# Patient Record
Sex: MALE | Race: WHITE | NOT HISPANIC OR LATINO | Employment: OTHER | URBAN - METROPOLITAN AREA
[De-identification: names, ages, dates, MRNs, and addresses within clinical notes are randomized per-mention and may not be internally consistent; named-entity substitution may affect disease eponyms.]

---

## 2021-05-18 ENCOUNTER — TELEPHONE (OUTPATIENT)
Dept: SCHEDULING | Facility: IMAGING CENTER | Age: 66
End: 2021-05-18

## 2021-05-20 ENCOUNTER — OFFICE VISIT (OUTPATIENT)
Dept: MEDICAL GROUP | Facility: PHYSICIAN GROUP | Age: 66
End: 2021-05-20
Payer: MEDICARE

## 2021-05-20 VITALS
HEIGHT: 68 IN | WEIGHT: 215.8 LBS | HEART RATE: 61 BPM | OXYGEN SATURATION: 97 % | BODY MASS INDEX: 32.71 KG/M2 | SYSTOLIC BLOOD PRESSURE: 122 MMHG | TEMPERATURE: 97.1 F | RESPIRATION RATE: 18 BRPM | DIASTOLIC BLOOD PRESSURE: 62 MMHG

## 2021-05-20 DIAGNOSIS — R94.4 DECREASED GFR: ICD-10-CM

## 2021-05-20 DIAGNOSIS — E78.5 HYPERLIPIDEMIA, UNSPECIFIED HYPERLIPIDEMIA TYPE: ICD-10-CM

## 2021-05-20 DIAGNOSIS — E55.9 VITAMIN D DEFICIENCY: ICD-10-CM

## 2021-05-20 DIAGNOSIS — R73.01 ELEVATED FASTING GLUCOSE: ICD-10-CM

## 2021-05-20 DIAGNOSIS — N40.0 BENIGN PROSTATIC HYPERPLASIA WITHOUT LOWER URINARY TRACT SYMPTOMS: ICD-10-CM

## 2021-05-20 DIAGNOSIS — D69.6 THROMBOCYTOPENIA, UNSPECIFIED (HCC): ICD-10-CM

## 2021-05-20 DIAGNOSIS — R73.03 PREDIABETES: ICD-10-CM

## 2021-05-20 DIAGNOSIS — I10 ESSENTIAL HYPERTENSION: ICD-10-CM

## 2021-05-20 DIAGNOSIS — Z00.00 HEALTH CARE MAINTENANCE: ICD-10-CM

## 2021-05-20 DIAGNOSIS — N52.8 OTHER MALE ERECTILE DYSFUNCTION: ICD-10-CM

## 2021-05-20 PROBLEM — M70.21 OLECRANON BURSITIS OF RIGHT ELBOW: Status: ACTIVE | Noted: 2021-05-20

## 2021-05-20 PROCEDURE — 99203 OFFICE O/P NEW LOW 30 MIN: CPT | Performed by: NURSE PRACTITIONER

## 2021-05-20 RX ORDER — ROSUVASTATIN CALCIUM 5 MG/1
TABLET, COATED ORAL DAILY
COMMUNITY
End: 2022-11-16 | Stop reason: SDUPTHER

## 2021-05-20 RX ORDER — TERAZOSIN 10 MG/1
10 CAPSULE ORAL
COMMUNITY
End: 2022-02-22 | Stop reason: SDUPTHER

## 2021-05-20 RX ORDER — CELECOXIB 200 MG/1
CAPSULE ORAL
COMMUNITY
Start: 2021-03-20 | End: 2021-05-20

## 2021-05-20 RX ORDER — NIFEDIPINE 30 MG/1
TABLET, EXTENDED RELEASE ORAL
COMMUNITY
Start: 2021-03-24 | End: 2021-05-20

## 2021-05-20 RX ORDER — TERAZOSIN 10 MG/1
CAPSULE ORAL
COMMUNITY
Start: 2021-03-24 | End: 2021-05-20

## 2021-05-20 RX ORDER — TADALAFIL 5 MG/1
2.5 TABLET ORAL
COMMUNITY

## 2021-05-20 RX ORDER — ASPIRIN 81 MG/1
81 TABLET, CHEWABLE ORAL DAILY
COMMUNITY
End: 2022-05-18 | Stop reason: CLARIF

## 2021-05-20 RX ORDER — BENAZEPRIL HYDROCHLORIDE 40 MG/1
TABLET ORAL DAILY
COMMUNITY
End: 2022-10-07 | Stop reason: SDUPTHER

## 2021-05-20 RX ORDER — ALPRAZOLAM 0.25 MG/1
TABLET ORAL
COMMUNITY
End: 2021-05-20

## 2021-05-20 RX ORDER — BENAZEPRIL HYDROCHLORIDE 40 MG/1
TABLET ORAL
COMMUNITY
Start: 2021-03-24 | End: 2021-05-20

## 2021-05-20 RX ORDER — HYDROCODONE BITARTRATE AND ACETAMINOPHEN 5; 325 MG/1; MG/1
TABLET ORAL
COMMUNITY
End: 2021-06-16

## 2021-05-20 RX ORDER — ROSUVASTATIN CALCIUM 5 MG/1
TABLET, COATED ORAL
COMMUNITY
Start: 2021-03-24 | End: 2021-05-20

## 2021-05-20 ASSESSMENT — ENCOUNTER SYMPTOMS
BACK PAIN: 0
NECK PAIN: 0
FEVER: 0
CLAUDICATION: 0
COUGH: 0
DIZZINESS: 1
FALLS: 0
WEIGHT LOSS: 0
HEADACHES: 0
DEPRESSION: 0
INSOMNIA: 0
BLOOD IN STOOL: 0
EYES NEGATIVE: 1
WEAKNESS: 0
WHEEZING: 0
ABDOMINAL PAIN: 0
PALPITATIONS: 0
SHORTNESS OF BREATH: 0
CHILLS: 0

## 2021-05-20 ASSESSMENT — PATIENT HEALTH QUESTIONNAIRE - PHQ9: CLINICAL INTERPRETATION OF PHQ2 SCORE: 0

## 2021-05-20 NOTE — ASSESSMENT & PLAN NOTE
Chronic and stable condition.  Currently taking amlodipine 10 mg daily, Benzapril 40 mg daily.  There is also medication for benazepril-hydrochlorothiazide however patient is unsure if he is still taking that or if it is the other 2 that he is taking, we will get a better medication list from his medical records or he will send us one.  Denies headaches, vision changes, lightheadedness, shortness of breath, swelling his lower extremities.

## 2021-05-20 NOTE — ASSESSMENT & PLAN NOTE
Patient had diagnosis of thrombocytopenia when he was in [place] and was seen by a hematologist.  At that time hematology told him that they would just continue to watch however they did not feel he had a true diagnosis of thrombocytopenia.  We will get new labs.  Addendum to previous note.  [facility] results received today 5/21/2021.  Blood was collected on 3/5/2021.  Platelet count of 126.  Patient was seen at [facility] by Dr. Edson Cardoso hematology for this.

## 2021-05-20 NOTE — ASSESSMENT & PLAN NOTE
Chronic and stable condition.  [facility] and now is more cautious with putting his arms and elbows on the tables  Currently controlled

## 2021-05-20 NOTE — PROGRESS NOTES
Chief Complaint   Patient presents with   • Establish Care      Subjective:     Antolin Omer is a 65 y.o. male who recently moved up here from [place] 8 months ago.    New labs received from [facility] on 5/21/2021.   Labs have been scanned in and are in the media tab.      He is presenting to establish care and management of :      Thrombocytopenia, unspecified (HCC)  Patient had diagnosis of thrombocytopenia when he was in [place] and was seen by a hematologist.  At that time hematology told him that they would just continue to watch however they did not feel he had a true diagnosis of thrombocytopenia.  We will get new labs.  Addendum to previous note.  [facility] diagnostic laboratory results received today 5/21/2021.  Blood was collected on 3/5/2021.  Platelet count of 126.  Patient was seen at [facility] by Dr. Edson Cardoso hematology for this.    Olecranon bursitis of right elbow  Chronic and stable condition.  Was recently seen at the [facility] for this and now is more cautious with putting his arms and elbows on the tables  Currently controlled    Essential hypertension  Chronic and stable condition.  Currently taking amlodipine 10 mg daily, Benzapril 40 mg daily.  There is also medication for benazepril-hydrochlorothiazide however patient is unsure if he is still taking that or if it is the other 2 that he is taking, we will get a better medication list from his medical records or he will send us one.  Denies headaches, vision changes, lightheadedness, shortness of breath, swelling his lower extremities.    Benign prostatic hyperplasia without lower urinary tract symptoms  Chronic and stable condition.  Currently controlled with Hytrin 10 mg daily.      Other male erectile dysfunction  Chronic and stable condition.  Uses Cialis 5 mg as needed.    Hyperlipidemia  Chronic and stable condition.  Rosuvastatin 5 mg daily.  He states that he had labs completed in February we will obtain  those labs from his previous provider.  Denies any myalgias with this.  Addendum to previous note  Blood work collected from [facility] on 3/5/2021 was received today.  Cholesterol-124  HDL-54  Triglycerides-97  LDL-52    Vitamin D deficiency  Chronic and stable condition.  Currently takes 200 units vitamin D daily.    Elevated fasting glucose  Addendum to previous note.  Labs were received from [facility] that were collected on 3/5/2021.  It was noted that a fasting blood glucose was 144.    Prediabetes  Addendum from previous note.  Labs received from [facility] for blood work that was completed on 3/5/2021.  Fasting blood glucose was 144  Hemoglobin A1c was 5.8    Decreased GFR  Addendum to previous note  Blood work received from [facility] on 5/21/2021.  Blood work was collected on 3/5/2021.  Estimated GFR 56.  We will continue to monitor as patient has no history of this.      Review of Systems   Constitutional: Negative for chills, fever and weight loss.   HENT: Negative.    Eyes: Negative.    Respiratory: Negative for cough, shortness of breath and wheezing.    Cardiovascular: Negative for chest pain, palpitations, claudication and leg swelling.   Gastrointestinal: Negative for abdominal pain and blood in stool.   Genitourinary: Negative for dysuria and hematuria.   Musculoskeletal: Negative for back pain, falls, joint pain and neck pain.   Skin: Negative.    Neurological: Positive for dizziness. Negative for weakness and headaches.        Low sodium issues and can feel dizziness with that   Psychiatric/Behavioral: Negative for depression and suicidal ideas. The patient does not have insomnia.             Current Outpatient Medications:   •  benazepril (LOTENSIN) 40 MG tablet, Take  by mouth every day., Disp: , Rfl:   •  vitamin D (VITAMIND D3) 1000 UNIT Tab, Take 2,000 Units by mouth every day., Disp: , Rfl:   •  HYDROcodone-acetaminophen (NORCO) 5-325 MG Tab per tablet, Take  by  "mouth., Disp: , Rfl:   •  rosuvastatin (CRESTOR) 5 MG Tab, Take  by mouth every day., Disp: , Rfl:   •  tadalafil (CIALIS) 5 MG tablet, Take 5 mg by mouth., Disp: , Rfl:   •  terazosin (HYTRIN) 10 MG capsule, Take 10 mg by mouth., Disp: , Rfl:   •  aspirin (ASA) 81 MG Chew Tab chewable tablet, Chew 81 mg every day., Disp: , Rfl:   •  amlodipine (NORVASC) 10 MG TABS, Take 1 Tab by mouth every day., Disp: 30 Tab, Rfl: 0  •  carvedilol (COREG) 25 MG TABS, Take 1 Tab by mouth 2 times a day, with meals., Disp: 60 Tab, Rfl: 0  •  benazepril-hydrochlorthiazide (LOTENSIN HCT) 20-12.5 MG per tablet, Take 1 Tab by mouth every day., Disp: 30 Tab, Rfl: 0    History reviewed. No pertinent past medical history.    History reviewed. No pertinent surgical history.    Family History   Problem Relation Age of Onset   • Stroke Mother    • Heart Disease Mother         bypass   • Lung Disease Sister         COPD   • Diabetes Sister        Patient has no known allergies.    Allergies, past medical history, past surgical history, family history, social history reviewed and updated    Objective:     Vitals: /62   Pulse 61   Temp 36.2 °C (97.1 °F) (Temporal)   Resp 18   Ht 1.727 m (5' 8\")   Wt 97.9 kg (215 lb 12.8 oz)   SpO2 97%   BMI 32.81 kg/m²   General: Alert, pleasant, NAD  EYES:   PERRL, EOMI, no icterus or pallor.  Conjunctivae and lids normal.   HENT:  Normocephalic.  External ears normal. Tympanic membranes pearly, opaque.  No nasal drainage present.  Oropharynx non-erythematous, mucous membranes moist.  Neck supple.   No thyromegaly or masses palpated. No cervical or supraclavicular lymphadenopathy.  Heart: Regular rate and rhythm.  S1 and S2 normal.  No murmurs appreciated.  Respiratory: Normal respiratory effort.  Clear to auscultation bilaterally.  Abdomen: Non-distended, soft, non-tender, no guarding/rebound. Bowel sounds present.  No hepatosplenomegaly.  No hernias.  Skin: Warm, dry, no rashes.  Musculoskeletal: " Gait is normal.  Moves all extremities well.    Extremities: No clubbing, cyanosis or edema noted.   Neurological: No tremors, sensation grossly intact, tone/strength normal, gait is normal, CN2-12 intact.  Psych:  Affect/mood is normal, judgement is good, memory is intact, grooming is appropriate.    Assessment/Plan:     1. Health care maintenance  Patient states he had a colonoscopy roughly 5 years ago.  We will get medical records.  He also states that he has been vaccinated for Tdap, zosters, pneumonia which we will also get records for.    2. Essential hypertension  - CBC WITH DIFFERENTIAL; Future  - Comp Metabolic Panel; Future  We will obtain medical records for a better clarification on his medication list.      3. Benign prostatic hyperplasia without lower urinary tract symptoms  Continue with medications as prescribed    4. Other male erectile dysfunction  Continue with medications as prescribed    5. Hyperlipidemia, unspecified hyperlipidemia type  - Lipid Profile; Future  We will obtain medical records for his labs that were completed in February.  Continue with lifestyle modifications, diet and exercise.    6. Vitamin D deficiency  - VITAMIN D,25 HYDROXY; Future    7. Thrombocytopenia, unspecified (HCC)  - CBC WITH DIFFERENTIAL; Future  We will trend his CBC from his February exam.    Discussed with patient possible alternative diagnoses.     Reviewed indication, dosage, usage and potential adverse effects of prescribed medications.     Reviewed risks and benefits of treatment plan. Patient verbalizes understanding of all instruction and verbally agrees to plan.    Discussed plan with the patient, and she agrees to the above.      I personally reviewed prior external notes and test results pertinent to today's visit.        Return in about 4 weeks (around 6/17/2021).    My total time spent caring for the patient on the day of the encounter was 30 minutes.   This does not include time spent on separately  billable procedures/tests.     Please note that this dictation was created using voice recognition software. I have made every reasonable attempt to correct obvious errors, but I expect that there may be errors of grammar and possibly content that I did not discover before finalizing the note.

## 2021-05-20 NOTE — ASSESSMENT & PLAN NOTE
Chronic and stable condition.  Rosuvastatin 5 mg daily.  He states that he had labs completed in February we will obtain those labs from his previous provider.  Denies any myalgias with this.  Addendum to previous note  Blood work collected from [facility] on 3/5/2021 was received today.  Cholesterol-124  HDL-54  Triglycerides-97  LDL-52

## 2021-05-21 PROBLEM — R73.01 ELEVATED FASTING GLUCOSE: Status: ACTIVE | Noted: 2021-05-21

## 2021-05-21 PROBLEM — R73.03 PREDIABETES: Status: ACTIVE | Noted: 2021-05-21

## 2021-05-21 PROBLEM — R94.4 DECREASED GFR: Status: ACTIVE | Noted: 2021-05-21

## 2021-05-21 NOTE — ASSESSMENT & PLAN NOTE
Addendum to previous note  Blood work received from [facility] on 5/21/2021.  Blood work was collected on 3/5/2021.  Estimated GFR 56.  We will continue to monitor as patient has no history of this.

## 2021-06-16 ENCOUNTER — OFFICE VISIT (OUTPATIENT)
Dept: MEDICAL GROUP | Facility: PHYSICIAN GROUP | Age: 66
End: 2021-06-16
Payer: COMMERCIAL

## 2021-06-16 VITALS
DIASTOLIC BLOOD PRESSURE: 72 MMHG | OXYGEN SATURATION: 97 % | WEIGHT: 221 LBS | TEMPERATURE: 97.3 F | SYSTOLIC BLOOD PRESSURE: 130 MMHG | BODY MASS INDEX: 33.49 KG/M2 | HEART RATE: 71 BPM | RESPIRATION RATE: 12 BRPM | HEIGHT: 68 IN

## 2021-06-16 DIAGNOSIS — E87.1 HYPONATREMIA: ICD-10-CM

## 2021-06-16 DIAGNOSIS — E78.5 HYPERLIPIDEMIA, UNSPECIFIED HYPERLIPIDEMIA TYPE: ICD-10-CM

## 2021-06-16 DIAGNOSIS — I10 ESSENTIAL HYPERTENSION: ICD-10-CM

## 2021-06-16 PROCEDURE — 99213 OFFICE O/P EST LOW 20 MIN: CPT | Performed by: NURSE PRACTITIONER

## 2021-06-16 RX ORDER — NIFEDIPINE 30 MG/1
TABLET, EXTENDED RELEASE ORAL
COMMUNITY
Start: 2021-06-02 | End: 2021-06-16

## 2021-06-16 ASSESSMENT — ENCOUNTER SYMPTOMS
TINGLING: 0
WEAKNESS: 0
COUGH: 0
SHORTNESS OF BREATH: 0
DIZZINESS: 0
PALPITATIONS: 0
WEIGHT LOSS: 0
ABDOMINAL PAIN: 0
CHILLS: 0
HEADACHES: 1
FEVER: 0

## 2021-06-16 NOTE — ASSESSMENT & PLAN NOTE
Chronic and stable condition.  States that he has been told previously that he occasionally gets low on his sodiums  Last sodium was 132.  Denies any nausea, vomiting, headaches, confusion

## 2021-06-16 NOTE — PROGRESS NOTES
CC:  Chief Complaint   Patient presents with   • Lab Results       HISTORY OF PRESENT ILLNESS: Patient is a 65 y.o. male established patient presenting follow up labs      Essential hypertension  Chronic and stable condition.  He is currently taking amlodipine 10 mg daily, carvedilol 25 mg twice daily he also states that he is taking benazepril but is unsure if it is benazepril with hydrochlorothiazide or-regular benazepril.  He will be going home to look at the medications today.  Blood pressure in the office today was 130/72  Denies any change in vision, chest pain, difficulty breathing or swelling his lower extremities  States that occasionally he will get a headache however he is not believe that this is related to his blood pressure    Hyperlipidemia  Chronic and stable condition.   Controlled rosuvastatin 5 mg nightly.  Denies any myalgias with this medication    Hyponatremia  Chronic and stable condition.  States that he has been told previously that he occasionally gets low on his sodiums  Last sodium was 132.  Denies any nausea, vomiting, headaches, confusion      Patient Active Problem List    Diagnosis Date Noted   • Hyponatremia 06/16/2021   • Elevated fasting glucose 05/21/2021   • Prediabetes 05/21/2021   • Decreased GFR 05/21/2021   • Olecranon bursitis of right elbow 05/20/2021   • Essential hypertension 05/20/2021   • Benign prostatic hyperplasia without lower urinary tract symptoms 05/20/2021   • Other male erectile dysfunction 05/20/2021   • Hyperlipidemia 05/20/2021   • Vitamin D deficiency 05/20/2021   • Thrombocytopenia, unspecified (HCC) 04/12/2021      Allergies:Patient has no known allergies.    Current Outpatient Medications   Medication Sig Dispense Refill   • benazepril (LOTENSIN) 40 MG tablet Take  by mouth every day.     • vitamin D (VITAMIND D3) 1000 UNIT Tab Take 2,000 Units by mouth every day.     • rosuvastatin (CRESTOR) 5 MG Tab Take  by mouth every day.     • tadalafil (CIALIS) 5  "MG tablet Take 5 mg by mouth.     • terazosin (HYTRIN) 10 MG capsule Take 10 mg by mouth.     • aspirin (ASA) 81 MG Chew Tab chewable tablet Chew 81 mg every day.     • amlodipine (NORVASC) 10 MG TABS Take 1 Tab by mouth every day. 30 Tab 0   • carvedilol (COREG) 25 MG TABS Take 1 Tab by mouth 2 times a day, with meals. 60 Tab 0     No current facility-administered medications for this visit.       Social History     Tobacco Use   • Smoking status: Former Smoker     Types: Cigarettes     Quit date:      Years since quittin.4   • Smokeless tobacco: Never Used   Vaping Use   • Vaping Use: Never used   Substance Use Topics   • Alcohol use: Yes     Alcohol/week: 2.4 - 3.0 oz     Types: 4 - 5 Cans of beer per week     Comment: occ   • Drug use: Never     Social History     Social History Narrative   • Not on file       Family History   Problem Relation Age of Onset   • Stroke Mother    • Heart Disease Mother         bypass   • Lung Disease Sister         COPD   • Diabetes Sister         Review of Systems   Constitutional: Negative for chills, fever and weight loss.   Respiratory: Negative for cough and shortness of breath.    Cardiovascular: Negative for chest pain, palpitations and leg swelling.   Gastrointestinal: Negative for abdominal pain.   Genitourinary: Negative for hematuria.   Neurological: Positive for headaches. Negative for dizziness, tingling and weakness.             - NOTE: All other systems reviewed and are negative, except as in HPI.      Exam:    /72   Pulse 71   Temp 36.3 °C (97.3 °F) (Temporal)   Resp 12   Ht 1.727 m (5' 8\")   Wt 100 kg (221 lb)   SpO2 97%  Body mass index is 33.6 kg/m².    General: Alert, pleasant, NAD  EYES:   PERRL, EOMI, no icterus or pallor.  Conjunctivae and lids normal.   HENT:  Normocephalic.  External ears normal.  Heart: Regular rate and rhythm.  S1 and S2 normal.  No murmurs appreciated.  Respiratory: Normal respiratory effort.  Clear to auscultation " bilaterally.  Skin: Warm, dry, no rashes.  Musculoskeletal: Gait is normal.  Moves all extremities well.    Extremities: No clubbing, cyanosis or edema noted.   Neurological: No tremors, sensation grossly intact, tone/strength normal, gait is normal, CN2-12 intact.  Psych:  Affect/mood is normal, judgement is good, memory is intact, grooming is appropriate.      LABS: 6/12/2021- [facility] in media tab: Results reviewed and discussed with the patient, questions answered.    Assessment/Plan:     1. Essential hypertension  - REFERRAL TO CARDIOLOGY  Patient is requesting to drop down some of his medications. Educated and discussed with patient that his blood pressure is low and within normal limits due to his blood pressure medications  Patient is requesting to follow-up with cardiology as he had one previous in [place]    2. Hyperlipidemia, unspecified hyperlipidemia type  - REFERRAL TO CARDIOLOGY  Continue lifestyle modifications diet and exercise.     3. Hyponatremia  We will continue to monitor    Discussed with patient possible alternative diagnoses, patient is to take all medications as prescribed.     If symptoms persist FU w/PCP, if symptoms worsen go to emergency room.     If experiencing any side effects from prescribed medications reports to the office immediately or go to emergency room.    Reviewed indication, dosage, usage and potential adverse effects of prescribed medications.     Reviewed risks and benefits of treatment plan. Patient verbalizes understanding of all instruction and verbally agrees to plan.      Return in about 6 months (around 12/16/2021) for AWV .    My total time spent caring for the patient on the day of the encounter was 20 minutes.   This does not include time spent on separately billable procedures/tests.     Please note that this dictation was created using voice recognition software. I have made every reasonable attempt to correct obvious errors, but I expect that there are errors  of grammar and possibly content that I did not discover before finalizing the note.

## 2021-06-16 NOTE — ASSESSMENT & PLAN NOTE
Chronic and stable condition.   Controlled rosuvastatin 5 mg nightly.  Denies any myalgias with this medication

## 2021-06-16 NOTE — ASSESSMENT & PLAN NOTE
Chronic and stable condition.  He is currently taking amlodipine 10 mg daily, carvedilol 25 mg twice daily he also states that he is taking benazepril but is unsure if it is benazepril with hydrochlorothiazide or-regular benazepril.  He will be going home to look at the medications today.  Blood pressure in the office today was 130/72  Denies any change in vision, chest pain, difficulty breathing or swelling his lower extremities  States that occasionally he will get a headache however he is not believe that this is related to his blood pressure

## 2021-09-16 NOTE — PROGRESS NOTES
Subjective:   Chief Complaint:   Chief Complaint   Patient presents with   • Hyperlipidemia     Dx: Hyperlipidemia   • Hypertension     Dx: Essential hypertension       Antolin Omer is a 66 y.o. male who is referred by BREE Solano to establish with a local cardiologist for coronary artery calcification, FH CAD/Valve disease, PACs.    Saw cardiologist in [place] in 2016 because of FH.  Had very minor CP.  Had coronary CT with plaque, started on statin.  Had LHC, no obstructive disease, 2016.  Also had stress test.  Was told to get an echo.  Was been on primary prevention ASA and statin.    Has HTN, controlled. 3 meds.  Not checking at home.    On statin, no actual HLP.    Has IFG, no DM.    Quit smoking around age 26.    No history of diabetes.  No history of autoimmune disease such as lupus or rheumatoid arthritis.  No chronic kidney disease.  No ETOH overuse. Daily beer, not overuse he tells me.  No caffeine overuse. A few per day.  No recreation substance use.  No hx asthma.    Has BPH, on one med for this.    He had adrenal gland tumor removed.    Has hyponatremia, does get some lightheadedness when it is low.    FH:  -Mother  at 74 of CVA, valve replacement around 70 with CABG, copd/smoker  -Father  at age 28 (he was 6 years old), car accident, was a   -Brother, alive at 71, had UTI, sepsis, CP, LHC, had 95% blockages and needs a new valve.    Not limited by chest pain, pressure or tightness with activity.   No significant dyspnea on exertion, orthopnea or lower extremity swelling.   No significant palpitations, lightheadedness, or presyncope/syncope.   No symptoms of leg claudication.   No stroke/TIA like symptoms.    Moved from [place], in [place], had lived here before.   to [place], not here today.  Contractor and .     DATA REVIEWED by me:  ECG (my personal interpretation) 21  Sinus, 55, PAC, first degree AV delay,     Echo pending    Most recent labs:       No  results found for: WBC, HEMOGLOBIN, HEMATOCRIT, MCV, INR, TSH   No results found for: SODIUM, POTASSIUM, CHLORIDE, CO2, GLUCOSE, BUN, CREATININE, GLOMRATE   No results found for: ASTSGOT, ALTSGPT, ALBUMIN   No results found for: CHOLSTRLTOT, LDL, HDL, TRIGLYCERIDE  No results for input(s): NTPROBNP, TROPONINT in the last 72 hours.      History reviewed. No pertinent past medical history.  History reviewed. No pertinent surgical history.  Family History   Problem Relation Age of Onset   • Stroke Mother          of CVA at 74   • Heart Disease Mother         CABG/Valve at 70   • Other Father          at 28 MVA   • Lung Disease Sister         COPD   • Diabetes Sister    • Heart Disease Brother         3V CAD, going for valve and CABG     Social History     Socioeconomic History   • Marital status:      Spouse name: Not on file   • Number of children: Not on file   • Years of education: Not on file   • Highest education level: Not on file   Occupational History   • Occupation: retail   Tobacco Use   • Smoking status: Former Smoker     Types: Cigarettes     Quit date:      Years since quittin.7   • Smokeless tobacco: Never Used   Vaping Use   • Vaping Use: Never used   Substance and Sexual Activity   • Alcohol use: Yes     Alcohol/week: 2.4 - 3.0 oz     Types: 4 - 5 Cans of beer per week     Comment: occ   • Drug use: Never   • Sexual activity: Yes   Other Topics Concern   • Not on file   Social History Narrative   • Not on file     Social Determinants of Health     Financial Resource Strain:    • Difficulty of Paying Living Expenses:    Food Insecurity:    • Worried About Running Out of Food in the Last Year:    • Ran Out of Food in the Last Year:    Transportation Needs:    • Lack of Transportation (Medical):    • Lack of Transportation (Non-Medical):    Physical Activity:    • Days of Exercise per Week:    • Minutes of Exercise per Session:    Stress:    • Feeling of Stress :    Social  "Connections:    • Frequency of Communication with Friends and Family:    • Frequency of Social Gatherings with Friends and Family:    • Attends Restorationism Services:    • Active Member of Clubs or Organizations:    • Attends Club or Organization Meetings:    • Marital Status:    Intimate Partner Violence:    • Fear of Current or Ex-Partner:    • Emotionally Abused:    • Physically Abused:    • Sexually Abused:      No Known Allergies    Current Outpatient Medications   Medication Sig Dispense Refill   • NIFEdipine SR (PROCARDIA-XL) 30 MG tablet Take 30 mg by mouth every day.     • benazepril (LOTENSIN) 40 MG tablet Take  by mouth every day.     • vitamin D (VITAMIND D3) 1000 UNIT Tab Take 2,000 Units by mouth every day.     • rosuvastatin (CRESTOR) 5 MG Tab Take  by mouth every day.     • tadalafil (CIALIS) 5 MG tablet Take 5 mg by mouth.     • terazosin (HYTRIN) 10 MG capsule Take 10 mg by mouth.     • aspirin (ASA) 81 MG Chew Tab chewable tablet Chew 81 mg every day.     • carvedilol (COREG) 25 MG TABS Take 1 Tab by mouth 2 times a day, with meals. 60 Tab 0     No current facility-administered medications for this visit.       ROS  All others systems reviewed and negative.     Objective:     /56 (BP Location: Left arm, Patient Position: Sitting, BP Cuff Size: Adult)   Pulse 60   Ht 1.727 m (5' 8\")   Wt 102 kg (224 lb 6.4 oz)   SpO2 96%  Body mass index is 34.12 kg/m².    General: No acute distress. Well nourished.  HEENT: EOM grossly intact, no scleral icterus, no pharyngeal erythema.   Neck:  No JVD, no bruits, trachea midline  CVS: RRR. Normal S1, S2. No M/R/G. No LE edema.  2+ radial pulses, 2+ PT pulses  Resp: CTAB. No wheezing or crackles/rhonchi. Normal respiratory effort.  Crepitus left anterior chest  Abdomen: Soft, NT, no maribel hepatomegaly.  MSK/Ext: No clubbing or cyanosis.  Skin: Warm and dry, no rashes.  Neurological: CN III-XII grossly intact. No focal deficits.   Psych: A&O x 3, appropriate " affect, good judgement        Assessment:     1. Essential hypertension  EKG    EC-ECHOCARDIOGRAM COMPLETE W/O CONT   2. Vitamin D deficiency     3. Elevated fasting glucose     4. Pure hypercholesterolemia  EKG   5. Coronary artery calcification seen on CT scan     6. Premature atrial contraction     7. Hyponatremia     8. IFG (impaired fasting glucose)     9. Family history of aortic stenosis  EC-ECHOCARDIOGRAM COMPLETE W/O CONT   10. Family history of coronary artery disease in brother     11. Family history of coronary artery disease in mother         Medical Decision Making:  Today's Assessment / Status / Plan:     -Blood pressure controlled  -On primary prevention aspirin and statin, given family history, I am fine with aspirin  -Not sure of his most recent cholesterol, will order today  -Needs echocardiogram, particularly with family history of valve disease, ordered today  -I do not know what to make of his left lung exam, it almost reminds me of crepitus but he does have normal breath sounds, this is not alarming to me but he may want to mention it to his primary care  -Hyponatremia is interesting, not on any medications that would intermittently cause this, unclear if related to adrenal tumor which was removed  -RTC 6 month        Written instructions given today:      Coronary artery calcification:    Most people over 70 have some degree of calcified heart disease.    Blockages in the heart artery should only have a stent or bypass if they are more than 70% blocked (at which point most people have symptoms such as chest pain or unusual shortness of breath with activity that goes away with rest).   People having a sudden heart attack should have a stent placed in the blocked artery.   People having chest pain that limits their ability to function could consider having a stent placed in the artery.  Otherwise, medication and lifestyle changes are the preferred treatments. Stents placed outside of severe  "symptoms or sudden heart attacks do not reduce mortality (likelihood of dying from a heart attack) and often people end up needing a stent within a stent later.  Lifestyle and medications are more important than stents or bypass under most circumstances.    The only things to do to lower your risk of sudden heart attack (or stroke which is the same disease but a different location- in the brain):    -Take a statin medication (as a vascular medication, not just a cholesterol medication) which is our most powerful weapon to stabilize the plaques and reduce inflammation making them less likely to rupture open and cause a sudden heart attack/stroke.   -Keep LDL cholesterol under 100 (or under 70 if you have already had a heart attack/stroke).  -Control blood pressure, under 130/80, ideally closer to 120/80.  -Control blood sugar, don't get diabetes.  -Don't smoke.  -Eat a heart healthy diet that reduces inflammation: Pritikin, Mediterranean, Vegetarian, Vegan  -Get regular exercise: 150 minutes of moderate exercise OR 75 minutes of very vigorous exercise every week.  -Keep your body mass index under 30, ideal BMI is 20-25.  -Know the signs and symptoms of heart attack and stroke and when to call 9-1-1.  -Shoveling snow can provoke a heart attack because the cold weather constricts heart arteries and heavy/prolonged straining can reduce blood flow down heart arteries.    Signs of a stroke: sudden inability to talk, smile on one side, confusion, inability to move arm/leg on one side, numbness/tingling of arm/leg on one side that is not typical. \"Think FAST.\"  F=face drooping  A=arm weakness  S=Speech difficulty  T= time to call 911 (do not give ride to someone, call ambulance to alert the hospital to start stroke protocol)    Signs of a heart attack: Anything very intense coming from the chest that lasts more than 15 minutes, consider calling 911.  -Pain or pressure in the chest that is intense, lasts more than 15 " minutes, not explained by other known reasons such as known/similar heartburn  -It can feel like severe heart burn but associated with nausea, vomiting  -It can be vague pain that radiates to the neck, jaw, shoulders, arm/arms, wrap around your back or even cause a toothache  -Can be associated with unusual shortness of breath at rest or sense of impending doom      Please look into the following diets:    Mediterranean diet.  Pritikin - used at [facility], probably one of the best for anti-inflammatory  DASH DIET - American Heart Association (mostly for hypertension)  Ornish Diet   Vegan diet (proven to reverse vascular disease)    Resources to learn more:  American Heart Association   Www.Cardiosmart.org, sponsored by the American College of cardiology.    -Echocardiogram, I will send a [employer] message when I get the results    -Fasting lipid panel, I will order today [facility] with your primary care blood work    -Call the [employer] line to get set up, either on your computer or by downloading an alonzo on your smart phone.  Keep them on the phone with you until you are sure you can access the alonzo or website.  If you use your phone, you can set up a finger print to use if you tend to forget passwords.  Call [phone] or [phone].    -You should always hear results of testing within 5 days with my interpretation, if you do not, send a [employer] message or call the office: [phone].      Return in about 6 months (around 3/22/2022).    It is my pleasure to participate in the care of Mr. Omer.  Please do not hesitate to contact me with questions or concerns.    Soraya Turcios MD, [facility]  Cardiologist [facility]    Please note that this dictation was created using voice recognition software. I have made every reasonable attempt to correct obvious errors, but it is possible there are errors of grammar and possibly content that I did not discover before  finalizing the note.

## 2021-09-22 ENCOUNTER — OFFICE VISIT (OUTPATIENT)
Dept: CARDIOLOGY | Facility: PHYSICIAN GROUP | Age: 66
End: 2021-09-22
Payer: MEDICARE

## 2021-09-22 VITALS
HEIGHT: 68 IN | OXYGEN SATURATION: 96 % | HEART RATE: 60 BPM | SYSTOLIC BLOOD PRESSURE: 122 MMHG | DIASTOLIC BLOOD PRESSURE: 56 MMHG | BODY MASS INDEX: 34.01 KG/M2 | WEIGHT: 224.4 LBS

## 2021-09-22 DIAGNOSIS — Z82.49 FAMILY HISTORY OF CORONARY ARTERY DISEASE IN BROTHER: ICD-10-CM

## 2021-09-22 DIAGNOSIS — I49.1 PREMATURE ATRIAL CONTRACTION: ICD-10-CM

## 2021-09-22 DIAGNOSIS — R73.01 IFG (IMPAIRED FASTING GLUCOSE): ICD-10-CM

## 2021-09-22 DIAGNOSIS — E55.9 VITAMIN D DEFICIENCY: ICD-10-CM

## 2021-09-22 DIAGNOSIS — E78.00 PURE HYPERCHOLESTEROLEMIA: ICD-10-CM

## 2021-09-22 DIAGNOSIS — Z82.49 FAMILY HISTORY OF CORONARY ARTERY DISEASE IN MOTHER: ICD-10-CM

## 2021-09-22 DIAGNOSIS — Z82.49 FAMILY HISTORY OF AORTIC STENOSIS: ICD-10-CM

## 2021-09-22 DIAGNOSIS — I25.10 CORONARY ARTERY CALCIFICATION SEEN ON CT SCAN: ICD-10-CM

## 2021-09-22 DIAGNOSIS — E87.1 HYPONATREMIA: ICD-10-CM

## 2021-09-22 DIAGNOSIS — I10 ESSENTIAL HYPERTENSION: ICD-10-CM

## 2021-09-22 DIAGNOSIS — R73.01 ELEVATED FASTING GLUCOSE: ICD-10-CM

## 2021-09-22 PROCEDURE — 99204 OFFICE O/P NEW MOD 45 MIN: CPT | Performed by: INTERNAL MEDICINE

## 2021-09-22 RX ORDER — NIFEDIPINE 30 MG/1
30 TABLET, EXTENDED RELEASE ORAL DAILY
COMMUNITY
End: 2022-03-31 | Stop reason: SDUPTHER

## 2021-09-22 NOTE — LETTER
[facility]   [address] [place] [address]  Phone: [phone]  Fax: [phone]              Antolin Omer  1955    Encounter Date: 2021    Soraya Turcios M.D.          PROGRESS NOTE:  Subjective:   Chief Complaint:   Chief Complaint   Patient presents with   • Hyperlipidemia     Dx: Hyperlipidemia   • Hypertension     Dx: Essential hypertension       Antolin Omer is a 66 y.o. male who is referred by BREE Solano to establish with a local cardiologist for coronary artery calcification, FH CAD/Valve disease, PACs.    Saw cardiologist in [place] in 2016 because of FH.  Had very minor CP.  Had coronary CT with plaque, started on statin.  Had LHC, no obstructive disease, 2016.  Also had stress test.  Was told to get an echo.  Was been on primary prevention ASA and statin.    Has HTN, controlled. 3 meds.  Not checking at home.    On statin, no actual HLP.    Has IFG, no DM.    Quit smoking around age 26.    No history of diabetes.  No history of autoimmune disease such as lupus or rheumatoid arthritis.  No chronic kidney disease.  No ETOH overuse. Daily beer, not overuse he tells me.  No caffeine overuse. A few per day.  No recreation substance use.  No hx asthma.    Has BPH, on one med for this.    He had adrenal gland tumor removed.    Has hyponatremia, does get some lightheadedness when it is low.    FH:  -Mother  at 74 of CVA, valve replacement around 70 with CABG, copd/smoker  -Father  at age 28 (he was 6 years old), car accident, was a   -Brother, alive at 71, had UTI, sepsis, CP, LHC, had 95% blockages and needs a new valve.    Not limited by chest pain, pressure or tightness with activity.   No significant dyspnea on exertion, orthopnea or lower extremity swelling.   No significant palpitations, lightheadedness, or presyncope/syncope.   No symptoms of leg claudication.   No stroke/TIA like symptoms.    Moved  from [place], in [place], had lived here before.   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      History reviewed. No pertinent past medical history.  History reviewed. No pertinent surgical history.  Family History   Problem Relation Age of Onset   • Stroke Mother          of CVA at 74   • Heart Disease Mother         CABG/Valve at 70   • Other Father          at 28 MVA   • Lung Disease Sister         COPD   • Diabetes Sister    • Heart Disease Brother         3V CAD, going for valve and CABG     Social History     Socioeconomic History   • Marital status:      Spouse name: Not on file   • Number of children: Not on file   • Years of education: Not on file   • Highest education level: Not on file   Occupational History   • Occupation: retail   Tobacco Use   • Smoking status: Former Smoker     Types: Cigarettes     Quit date:      Years since quittin.7   • Smokeless tobacco: Never Used   Vaping Use   • Vaping Use: Never used   Substance and Sexual Activity   • Alcohol use: Yes     Alcohol/week: 2.4 - 3.0 oz     Types: 4 - 5 Cans of beer per week     Comment: occ   • Drug use: Never   • Sexual activity: Yes   Other Topics Concern   • Not on file   Social History Narrative   • Not on file     Social Determinants of Health     Financial Resource Strain:    • Difficulty of Paying Living Expenses:    Food Insecurity:    • Worried About Running Out of Food in the Last Year:    • Ran Out of Food  "in the Last Year:    Transportation Needs:    • Lack of Transportation (Medical):    • Lack of Transportation (Non-Medical):    Physical Activity:    • Days of Exercise per Week:    • Minutes of Exercise per Session:    Stress:    • Feeling of Stress :    Social Connections:    • Frequency of Communication with Friends and Family:    • Frequency of Social Gatherings with Friends and Family:    • Attends Anabaptist Services:    • Active Member of Clubs or Organizations:    • Attends Club or Organization Meetings:    • Marital Status:    Intimate Partner Violence:    • Fear of Current or Ex-Partner:    • Emotionally Abused:    • Physically Abused:    • Sexually Abused:      No Known Allergies    Current Outpatient Medications   Medication Sig Dispense Refill   • NIFEdipine SR (PROCARDIA-XL) 30 MG tablet Take 30 mg by mouth every day.     • benazepril (LOTENSIN) 40 MG tablet Take  by mouth every day.     • vitamin D (VITAMIND D3) 1000 UNIT Tab Take 2,000 Units by mouth every day.     • rosuvastatin (CRESTOR) 5 MG Tab Take  by mouth every day.     • tadalafil (CIALIS) 5 MG tablet Take 5 mg by mouth.     • terazosin (HYTRIN) 10 MG capsule Take 10 mg by mouth.     • aspirin (ASA) 81 MG Chew Tab chewable tablet Chew 81 mg every day.     • carvedilol (COREG) 25 MG TABS Take 1 Tab by mouth 2 times a day, with meals. 60 Tab 0     No current facility-administered medications for this visit.       ROS  All others systems reviewed and negative.     Objective:     /56 (BP Location: Left arm, Patient Position: Sitting, BP Cuff Size: Adult)   Pulse 60   Ht 1.727 m (5' 8\")   Wt 102 kg (224 lb 6.4 oz)   SpO2 96%  Body mass index is 34.12 kg/m².    General: No acute distress. Well nourished.  HEENT: EOM grossly intact, no scleral icterus, no pharyngeal erythema.   Neck:  No JVD, no bruits, trachea midline  CVS: RRR. Normal S1, S2. No M/R/G. No LE edema.  2+ radial pulses, 2+ PT pulses  Resp: CTAB. No wheezing or " crackles/rhonchi. Normal respiratory effort.  Crepitus left anterior chest  Abdomen: Soft, NT, no maribel hepatomegaly.  MSK/Ext: No clubbing or cyanosis.  Skin: Warm and dry, no rashes.  Neurological: CN III-XII grossly intact. No focal deficits.   Psych: A&O x 3, appropriate affect, good judgement        Assessment:     1. Essential hypertension  EKG    EC-ECHOCARDIOGRAM COMPLETE W/O CONT   2. Vitamin D deficiency     3. Elevated fasting glucose     4. Pure hypercholesterolemia  EKG   5. Coronary artery calcification seen on CT scan     6. Premature atrial contraction     7. Hyponatremia     8. IFG (impaired fasting glucose)     9. Family history of aortic stenosis  EC-ECHOCARDIOGRAM COMPLETE W/O CONT   10. Family history of coronary artery disease in brother     11. Family history of coronary artery disease in mother         Medical Decision Making:  Today's Assessment / Status / Plan:     -Blood pressure controlled  -On primary prevention aspirin and statin, given family history, I am fine with aspirin  -Not sure of his most recent cholesterol, will order today  -Needs echocardiogram, particularly with family history of valve disease, ordered today  -I do not know what to make of his left lung exam, it almost reminds me of crepitus but he does have normal breath sounds, this is not alarming to me but he may want to mention it to his primary care  -Hyponatremia is interesting, not on any medications that would intermittently cause this, unclear if related to adrenal tumor which was removed  -RTC 6 month        Written instructions given today:      Coronary artery calcification:    Most people over 70 have some degree of calcified heart disease.    Blockages in the heart artery should only have a stent or bypass if they are more than 70% blocked (at which point most people have symptoms such as chest pain or unusual shortness of breath with activity that goes away with rest).   People having a sudden heart attack  "should have a stent placed in the blocked artery.   People having chest pain that limits their ability to function could consider having a stent placed in the artery.  Otherwise, medication and lifestyle changes are the preferred treatments. Stents placed outside of severe symptoms or sudden heart attacks do not reduce mortality (likelihood of dying from a heart attack) and often people end up needing a stent within a stent later.  Lifestyle and medications are more important than stents or bypass under most circumstances.    The only things to do to lower your risk of sudden heart attack (or stroke which is the same disease but a different location- in the brain):    -Take a statin medication (as a vascular medication, not just a cholesterol medication) which is our most powerful weapon to stabilize the plaques and reduce inflammation making them less likely to rupture open and cause a sudden heart attack/stroke.   -Keep LDL cholesterol under 100 (or under 70 if you have already had a heart attack/stroke).  -Control blood pressure, under 130/80, ideally closer to 120/80.  -Control blood sugar, don't get diabetes.  -Don't smoke.  -Eat a heart healthy diet that reduces inflammation: Pritikin, Mediterranean, Vegetarian, Vegan  -Get regular exercise: 150 minutes of moderate exercise OR 75 minutes of very vigorous exercise every week.  -Keep your body mass index under 30, ideal BMI is 20-25.  -Know the signs and symptoms of heart attack and stroke and when to call 9-1-1.  -Shoveling snow can provoke a heart attack because the cold weather constricts heart arteries and heavy/prolonged straining can reduce blood flow down heart arteries.    Signs of a stroke: sudden inability to talk, smile on one side, confusion, inability to move arm/leg on one side, numbness/tingling of arm/leg on one side that is not typical. \"Think FAST.\"  F=face drooping  A=arm weakness  S=Speech difficulty  T= time to call 911 (do not give ride to " someone, call ambulance to alert the hospital to start stroke protocol)    Signs of a heart attack: Anything very intense coming from the chest that lasts more than 15 minutes, consider calling 911.  -Pain or pressure in the chest that is intense, lasts more than 15 minutes, not explained by other known reasons such as known/similar heartburn  -It can feel like severe heart burn but associated with nausea, vomiting  -It can be vague pain that radiates to the neck, jaw, shoulders, arm/arms, wrap around your back or even cause a toothache  -Can be associated with unusual shortness of breath at rest or sense of impending doom      Please look into the following diets:    Mediterranean diet.  Pritikin - used at [facility], probably one of the best for anti-inflammatory  DASH DIET - American Heart Association (mostly for hypertension)  Ornish Diet   Vegan diet (proven to reverse vascular disease)    Resources to learn more:  American Heart Association   Www.Cardiosmart.org, sponsored by the American College of cardiology.    -Echocardiogram, I will send a [employer] message when I get the results    -Fasting lipid panel, I will order today [facility] with your primary care blood work    -Call the [employer] line to get set up, either on your computer or by downloading an alonzo on your smart phone.  Keep them on the phone with you until you are sure you can access the alonzo or website.  If you use your phone, you can set up a finger print to use if you tend to forget passwords.  Call [phone] or [phone].    -You should always hear results of testing within 5 days with my interpretation, if you do not, send a [employer] message or call the office: [phone].      Return in about 6 months (around 3/22/2022).    It is my pleasure to participate in the care of Mr. Omer.  Please do not hesitate to contact me with questions or concerns.    Soraya Turcios MD, [facility]  Cardiologist [facility]  [facility] and V[facility]    Please note that this dictation was created using voice recognition software. I have made every reasonable attempt to correct obvious errors, but it is possible there are errors of grammar and possibly content that I did not discover before finalizing the note.        [email or web address]  [address] [address]  Via In Basket

## 2021-09-22 NOTE — PATIENT INSTRUCTIONS
Coronary artery calcification:    Most people over 70 have some degree of calcified heart disease.    Blockages in the heart artery should only have a stent or bypass if they are more than 70% blocked (at which point most people have symptoms such as chest pain or unusual shortness of breath with activity that goes away with rest).   People having a sudden heart attack should have a stent placed in the blocked artery.   People having chest pain that limits their ability to function could consider having a stent placed in the artery.  Otherwise, medication and lifestyle changes are the preferred treatments. Stents placed outside of severe symptoms or sudden heart attacks do not reduce mortality (likelihood of dying from a heart attack) and often people end up needing a stent within a stent later.  Lifestyle and medications are more important than stents or bypass under most circumstances.    The only things to do to lower your risk of sudden heart attack (or stroke which is the same disease but a different location- in the brain):    -Take a statin medication (as a vascular medication, not just a cholesterol medication) which is our most powerful weapon to stabilize the plaques and reduce inflammation making them less likely to rupture open and cause a sudden heart attack/stroke.   -Keep LDL cholesterol under 100 (or under 70 if you have already had a heart attack/stroke).  -Control blood pressure, under 130/80, ideally closer to 120/80.  -Control blood sugar, don't get diabetes.  -Don't smoke.  -Eat a heart healthy diet that reduces inflammation: Pritikin, Mediterranean, Vegetarian, Vegan  -Get regular exercise: 150 minutes of moderate exercise OR 75 minutes of very vigorous exercise every week.  -Keep your body mass index under 30, ideal BMI is 20-25.  -Know the signs and symptoms of heart attack and stroke and when to call 9-1-1.  -Shoveling snow can provoke a heart attack because the cold weather constricts heart  "arteries and heavy/prolonged straining can reduce blood flow down heart arteries.    Signs of a stroke: sudden inability to talk, smile on one side, confusion, inability to move arm/leg on one side, numbness/tingling of arm/leg on one side that is not typical. \"Think FAST.\"  F=face drooping  A=arm weakness  S=Speech difficulty  T= time to call 911 (do not give ride to someone, call ambulance to alert the hospital to start stroke protocol)    Signs of a heart attack: Anything very intense coming from the chest that lasts more than 15 minutes, consider calling 911.  -Pain or pressure in the chest that is intense, lasts more than 15 minutes, not explained by other known reasons such as known/similar heartburn  -It can feel like severe heart burn but associated with nausea, vomiting  -It can be vague pain that radiates to the neck, jaw, shoulders, arm/arms, wrap around your back or even cause a toothache  -Can be associated with unusual shortness of breath at rest or sense of impending doom      Please look into the following diets:    Mediterranean diet.  Pritikin - used at [facility], probably one of the best for anti-inflammatory  DASH DIET - American Heart Association (mostly for hypertension)  Ornish Diet   Vegan diet (proven to reverse vascular disease)    Resources to learn more:  American Heart Association   Www.Cardiosmart.org, sponsored by the American College of cardiology.    -Echocardiogram, I will send a WHOOP message when I get the results    -Fasting lipid panel, I will order today [facility] [place] with your primary care blood work    -Call the WHOOP line to get set up, either on your computer or by downloading an alonzo on your smart phone.  Keep them on the phone with you until you are sure you can access the alonzo or website.  If you use your phone, you can set up a finger print to use if you tend to forget passwords.  Call [phone] or [phone].    -You should always hear " results of testing within 5 days with my interpretation, if you do not, send a [employer] message or call the office: [phone].

## 2021-09-23 ENCOUNTER — HOSPITAL ENCOUNTER (OUTPATIENT)
Dept: CARDIOLOGY | Facility: MEDICAL CENTER | Age: 66
End: 2021-09-23
Attending: INTERNAL MEDICINE
Payer: MEDICARE

## 2021-09-23 DIAGNOSIS — Z82.49 FAMILY HISTORY OF AORTIC STENOSIS: ICD-10-CM

## 2021-09-23 DIAGNOSIS — I10 ESSENTIAL HYPERTENSION: ICD-10-CM

## 2021-09-23 LAB
LV EJECT FRACT MOD 2C 99903: 79.32
LV EJECT FRACT MOD 4C 99902: 71.35
LV EJECT FRACT MOD BP 99901: 76.08

## 2021-09-23 PROCEDURE — 93306 TTE W/DOPPLER COMPLETE: CPT

## 2021-09-23 PROCEDURE — 93306 TTE W/DOPPLER COMPLETE: CPT | Mod: 26 | Performed by: INTERNAL MEDICINE

## 2021-12-09 ENCOUNTER — HOSPITAL ENCOUNTER (OUTPATIENT)
Facility: MEDICAL CENTER | Age: 66
End: 2021-12-09
Attending: NURSE PRACTITIONER
Payer: MEDICARE

## 2021-12-09 ENCOUNTER — TELEPHONE (OUTPATIENT)
Dept: HEALTH INFORMATION MANAGEMENT | Facility: OTHER | Age: 66
End: 2021-12-09

## 2021-12-09 DIAGNOSIS — E78.5 HYPERLIPIDEMIA, UNSPECIFIED HYPERLIPIDEMIA TYPE: ICD-10-CM

## 2021-12-09 DIAGNOSIS — D69.6 THROMBOCYTOPENIA, UNSPECIFIED (HCC): ICD-10-CM

## 2021-12-09 DIAGNOSIS — E55.9 VITAMIN D DEFICIENCY: ICD-10-CM

## 2021-12-09 DIAGNOSIS — I10 ESSENTIAL HYPERTENSION: ICD-10-CM

## 2021-12-09 LAB
ALBUMIN SERPL BCP-MCNC: 4.9 G/DL (ref 3.2–4.9)
ALBUMIN/GLOB SERPL: 1.8 G/DL
ALP SERPL-CCNC: 61 U/L (ref 30–99)
ALT SERPL-CCNC: 16 U/L (ref 2–50)
ANION GAP SERPL CALC-SCNC: 11 MMOL/L (ref 7–16)
AST SERPL-CCNC: 15 U/L (ref 12–45)
BASOPHILS # BLD AUTO: 0.6 % (ref 0–1.8)
BASOPHILS # BLD: 0.05 K/UL (ref 0–0.12)
BILIRUB SERPL-MCNC: 1 MG/DL (ref 0.1–1.5)
BUN SERPL-MCNC: 17 MG/DL (ref 8–22)
CALCIUM SERPL-MCNC: 9.4 MG/DL (ref 8.5–10.5)
CHLORIDE SERPL-SCNC: 99 MMOL/L (ref 96–112)
CHOLEST SERPL-MCNC: 141 MG/DL (ref 100–199)
CO2 SERPL-SCNC: 22 MMOL/L (ref 20–33)
CREAT SERPL-MCNC: 1.26 MG/DL (ref 0.5–1.4)
EOSINOPHIL # BLD AUTO: 0.1 K/UL (ref 0–0.51)
EOSINOPHIL NFR BLD: 1.2 % (ref 0–6.9)
ERYTHROCYTE [DISTWIDTH] IN BLOOD BY AUTOMATED COUNT: 39.5 FL (ref 35.9–50)
FASTING STATUS PATIENT QL REPORTED: NORMAL
GLOBULIN SER CALC-MCNC: 2.7 G/DL (ref 1.9–3.5)
GLUCOSE SERPL-MCNC: 122 MG/DL (ref 65–99)
HCT VFR BLD AUTO: 45.5 % (ref 42–52)
HDLC SERPL-MCNC: 70 MG/DL
HGB BLD-MCNC: 15.9 G/DL (ref 14–18)
IMM GRANULOCYTES # BLD AUTO: 0.03 K/UL (ref 0–0.11)
IMM GRANULOCYTES NFR BLD AUTO: 0.4 % (ref 0–0.9)
LDLC SERPL CALC-MCNC: 57 MG/DL
LYMPHOCYTES # BLD AUTO: 2.52 K/UL (ref 1–4.8)
LYMPHOCYTES NFR BLD: 30.1 % (ref 22–41)
MCH RBC QN AUTO: 30 PG (ref 27–33)
MCHC RBC AUTO-ENTMCNC: 34.9 G/DL (ref 33.7–35.3)
MCV RBC AUTO: 85.8 FL (ref 81.4–97.8)
MONOCYTES # BLD AUTO: 0.54 K/UL (ref 0–0.85)
MONOCYTES NFR BLD AUTO: 6.4 % (ref 0–13.4)
NEUTROPHILS # BLD AUTO: 5.14 K/UL (ref 1.82–7.42)
NEUTROPHILS NFR BLD: 61.3 % (ref 44–72)
NRBC # BLD AUTO: 0 K/UL
NRBC BLD-RTO: 0 /100 WBC
PLATELET # BLD AUTO: 141 K/UL (ref 164–446)
PMV BLD AUTO: 9.7 FL (ref 9–12.9)
POTASSIUM SERPL-SCNC: 4.8 MMOL/L (ref 3.6–5.5)
PROT SERPL-MCNC: 7.6 G/DL (ref 6–8.2)
RBC # BLD AUTO: 5.3 M/UL (ref 4.7–6.1)
SODIUM SERPL-SCNC: 132 MMOL/L (ref 135–145)
TRIGL SERPL-MCNC: 70 MG/DL (ref 0–149)
WBC # BLD AUTO: 8.4 K/UL (ref 4.8–10.8)

## 2021-12-09 PROCEDURE — 80061 LIPID PANEL: CPT

## 2021-12-09 PROCEDURE — 82306 VITAMIN D 25 HYDROXY: CPT

## 2021-12-09 PROCEDURE — 85025 COMPLETE CBC W/AUTO DIFF WBC: CPT

## 2021-12-09 PROCEDURE — 36415 COLL VENOUS BLD VENIPUNCTURE: CPT

## 2021-12-09 PROCEDURE — 80053 COMPREHEN METABOLIC PANEL: CPT

## 2021-12-09 NOTE — TELEPHONE ENCOUNTER
Please transfer to Patient Outreach Team at [phone] when patient returns call.    [facility] Verified: yes/ effective date 1/1/2022

## 2021-12-13 ENCOUNTER — OFFICE VISIT (OUTPATIENT)
Dept: MEDICAL GROUP | Facility: PHYSICIAN GROUP | Age: 66
End: 2021-12-13
Payer: MEDICARE

## 2021-12-13 VITALS
WEIGHT: 215.4 LBS | HEIGHT: 68 IN | SYSTOLIC BLOOD PRESSURE: 138 MMHG | OXYGEN SATURATION: 96 % | BODY MASS INDEX: 32.64 KG/M2 | TEMPERATURE: 97.7 F | DIASTOLIC BLOOD PRESSURE: 64 MMHG | HEART RATE: 68 BPM | RESPIRATION RATE: 18 BRPM

## 2021-12-13 DIAGNOSIS — D69.6 THROMBOCYTOPENIA, UNSPECIFIED (HCC): ICD-10-CM

## 2021-12-13 DIAGNOSIS — M70.22 OLECRANON BURSITIS OF LEFT ELBOW: ICD-10-CM

## 2021-12-13 DIAGNOSIS — E87.1 HYPONATREMIA: ICD-10-CM

## 2021-12-13 DIAGNOSIS — I10 ESSENTIAL HYPERTENSION: ICD-10-CM

## 2021-12-13 DIAGNOSIS — R73.03 PREDIABETES: ICD-10-CM

## 2021-12-13 DIAGNOSIS — Z23 NEED FOR VACCINATION: ICD-10-CM

## 2021-12-13 DIAGNOSIS — E55.9 VITAMIN D DEFICIENCY: ICD-10-CM

## 2021-12-13 DIAGNOSIS — Z00.00 ENCOUNTER FOR ANNUAL WELLNESS VISIT (AWV) IN MEDICARE PATIENT: ICD-10-CM

## 2021-12-13 DIAGNOSIS — E78.5 HYPERLIPIDEMIA, UNSPECIFIED HYPERLIPIDEMIA TYPE: ICD-10-CM

## 2021-12-13 DIAGNOSIS — N40.0 BENIGN PROSTATIC HYPERPLASIA WITHOUT LOWER URINARY TRACT SYMPTOMS: ICD-10-CM

## 2021-12-13 DIAGNOSIS — R73.01 ELEVATED FASTING GLUCOSE: ICD-10-CM

## 2021-12-13 DIAGNOSIS — N52.8 OTHER MALE ERECTILE DYSFUNCTION: ICD-10-CM

## 2021-12-13 DIAGNOSIS — R94.4 DECREASED GFR: ICD-10-CM

## 2021-12-13 PROBLEM — M70.21 OLECRANON BURSITIS OF RIGHT ELBOW: Status: RESOLVED | Noted: 2021-05-20 | Resolved: 2021-12-13

## 2021-12-13 LAB — 25(OH)D3 SERPL-MCNC: 46 NG/ML (ref 30–80)

## 2021-12-13 PROCEDURE — 90472 IMMUNIZATION ADMIN EACH ADD: CPT | Performed by: NURSE PRACTITIONER

## 2021-12-13 PROCEDURE — 99214 OFFICE O/P EST MOD 30 MIN: CPT | Mod: 25 | Performed by: NURSE PRACTITIONER

## 2021-12-13 PROCEDURE — 90715 TDAP VACCINE 7 YRS/> IM: CPT | Performed by: NURSE PRACTITIONER

## 2021-12-13 PROCEDURE — 90662 IIV NO PRSV INCREASED AG IM: CPT | Performed by: NURSE PRACTITIONER

## 2021-12-13 PROCEDURE — G0008 ADMIN INFLUENZA VIRUS VAC: HCPCS | Performed by: NURSE PRACTITIONER

## 2021-12-13 RX ORDER — CEPHALEXIN 500 MG/1
500 CAPSULE ORAL
COMMUNITY
Start: 2021-12-10 | End: 2021-12-20

## 2021-12-13 RX ORDER — SULFAMETHOXAZOLE AND TRIMETHOPRIM 800; 160 MG/1; MG/1
1 TABLET ORAL 2 TIMES DAILY
COMMUNITY
Start: 2021-12-10 | End: 2021-12-20

## 2021-12-13 RX ORDER — CELECOXIB 200 MG/1
200 CAPSULE ORAL
COMMUNITY
Start: 2021-12-09 | End: 2021-12-16

## 2021-12-13 ASSESSMENT — ENCOUNTER SYMPTOMS: GENERAL WELL-BEING: EXCELLENT

## 2021-12-13 ASSESSMENT — ACTIVITIES OF DAILY LIVING (ADL): BATHING_REQUIRES_ASSISTANCE: 0

## 2021-12-13 ASSESSMENT — PATIENT HEALTH QUESTIONNAIRE - PHQ9: CLINICAL INTERPRETATION OF PHQ2 SCORE: 0

## 2021-12-13 ASSESSMENT — FIBROSIS 4 INDEX: FIB4 SCORE: 1.81

## 2021-12-13 NOTE — ASSESSMENT & PLAN NOTE
Chronic and stable condition  Hemoglobin A1c 5.8  We will continue to monitor  Continue lifestyle modifications, diet exercise

## 2021-12-13 NOTE — PROGRESS NOTES
Chief Complaint   Patient presents with   • Annual Exam     AWV Medicare         HPI:  Steve is a 66 y.o. here for Medicare Annual Wellness Visit      Patient Active Problem List    Diagnosis Date Noted   • Olecranon bursitis of left elbow 12/13/2021   • Hyponatremia 06/16/2021   • Elevated fasting glucose 05/21/2021   • Prediabetes 05/21/2021   • Decreased GFR 05/21/2021   • Essential hypertension 05/20/2021   • Benign prostatic hyperplasia without lower urinary tract symptoms 05/20/2021   • Other male erectile dysfunction 05/20/2021   • Hyperlipidemia 05/20/2021   • Vitamin D deficiency 05/20/2021   • Thrombocytopenia, unspecified (HCC) 04/12/2021       Current Outpatient Medications   Medication Sig Dispense Refill   • celecoxib (CELEBREX) 200 MG Cap Take 200 mg by mouth.     • cephALEXin (KEFLEX) 500 MG Cap Take 500 mg by mouth.     • sulfamethoxazole-trimethoprim (BACTRIM DS) 800-160 MG tablet Take 1 Tablet by mouth 2 times a day.     • NIFEdipine SR (PROCARDIA-XL) 30 MG tablet Take 30 mg by mouth every day.     • benazepril (LOTENSIN) 40 MG tablet Take  by mouth every day.     • vitamin D (VITAMIND D3) 1000 UNIT Tab Take 2,000 Units by mouth every day.     • rosuvastatin (CRESTOR) 5 MG Tab Take  by mouth every day.     • tadalafil (CIALIS) 5 MG tablet Take 5 mg by mouth.     • terazosin (HYTRIN) 10 MG capsule Take 10 mg by mouth.     • aspirin (ASA) 81 MG Chew Tab chewable tablet Chew 81 mg every day.     • carvedilol (COREG) 25 MG TABS Take 1 Tab by mouth 2 times a day, with meals. 60 Tab 0     No current facility-administered medications for this visit.        Patient is taking medications as noted in medication list.  Current supplements as per medication list.     Allergies: Patient has no known allergies.    Current social contact/activities:    Trying to travel to see family in [place] but not able to travel as much as they would like due to COVID    Is patient current with immunizations? Yes.    He   reports that he quit smoking about 41 years ago. His smoking use included cigarettes. He has never used smokeless tobacco. He reports current alcohol use of about 2.4 - 3.0 oz of alcohol per week. He reports that he does not use drugs.  Counseling given: Not Answered        DPA/Advanced directive: Patient does not have an Advanced Directive.  A packet and workshop information was given on Advanced Directives.    ROS:    Gait: Uses no assistive device   Ostomy: No   Other tubes: No   Amputations: No   Chronic oxygen use No   Last eye exam  2018  Wears hearing aids: No   : Denies any urinary leakage during the last 6 months      Screening:      Depression Screening    Little interest or pleasure in doing things?  0 - not at all  Feeling down, depressed, or hopeless? 0 - not at all  Patient Health Questionnaire Score: 0    If depressive symptoms identified deferred to follow up visit unless specifically addressed in assessment and plan.    Interpretation of PHQ-9 Total Score   Score Severity   1-4 No Depression   5-9 Mild Depression   10-14 Moderate Depression   15-19 Moderately Severe Depression   20-27 Severe Depression    Screening for Cognitive Impairment    Three Minute Recall (captain, garden, picture)  2/3    Hugo clock face with all 12 numbers and set the hands to show 5 past 8.  Yes    If cognitive concerns identified, deferred for follow up unless specifically addressed in assessment and plan.    Fall Risk Assessment    Has the patient had two or more falls in the last year or any fall with injury in the last year?  No  If fall risk identified, deferred for follow up unless specifically addressed in assessment and plan.    Safety Assessment    Throw rugs on floor.  Yes  Handrails on all stairs.  No  Good lighting in all hallways.  Yes  Difficulty hearing.  No  Patient counseled about all safety risks that were identified.    Functional Assessment ADLs    Are there any barriers preventing you from cooking  for yourself or meeting nutritional needs?  No.    Are there any barriers preventing you from driving safely or obtaining transportation?  No.    Are there any barriers preventing you from using a telephone or calling for help?  No.    Are there any barriers preventing you from shopping?  No.    Are there any barriers preventing you from taking care of your own finances?  No.    Are there any barriers preventing you from managing your medications?  No.    Are there any barriers preventing you from showering, bathing or dressing yourself?  No.    Are you currently engaging in any exercise or physical activity?  Yes.     What is your perception of your health?  Excellent.    Health Maintenance Summary          Overdue - Annual Wellness Visit (Once) Overdue - never done    No completion history exists for this topic.          Postponed - IMM ZOSTER VACCINES (1 of 2) Postponed until 5/13/2022    No completion history exists for this topic.          Postponed - IMM PNEUMOCOCCAL VACCINE: 65+ Years (1 of 1 - PPSV23) Postponed until 12/13/2022    No completion history exists for this topic.          COLORECTAL CANCER SCREENING (COLONOSCOPY - Every 10 Years) Next due on 6/7/2026 06/07/2016  COLONOSCOPY (Done)          IMM DTaP/Tdap/Td Vaccine (2 - Td or Tdap) Next due on 12/13/2031 12/13/2021  Imm Admin: Tdap Vaccine          COVID-19 Vaccine (Series Information) Completed    11/19/2021  Imm Admin: Pfizer SARS-CoV-2 Vaccine 12+    04/16/2021  Imm Admin: Moderna SARS-CoV-2 Vaccine    03/19/2021  Imm Admin: Moderna SARS-CoV-2 Vaccine          IMM INFLUENZA (Series Information) Completed    12/13/2021  Imm Admin: Influenza Vaccine Adult HD    11/06/2012  Imm Admin: Influenza Seasonal Injectable - Historical Data          IMM HEP B VACCINE (Series Information) Aged Out    No completion history exists for this topic.          IMM MENINGOCOCCAL VACCINE (MCV4) (Series Information) Aged Out    No completion history exists for  "this topic.          Discontinued - HEPATITIS C SCREENING  Discontinued    No completion history exists for this topic.                Patient Care Team:  KRISTEN Mccloud as PCP - General (Nurse Practitioner Primary Care)    Social History     Tobacco Use   • Smoking status: Former Smoker     Types: Cigarettes     Quit date:      Years since quittin.9   • Smokeless tobacco: Never Used   Vaping Use   • Vaping Use: Never used   Substance Use Topics   • Alcohol use: Yes     Alcohol/week: 2.4 - 3.0 oz     Types: 4 - 5 Cans of beer per week     Comment: occ   • Drug use: Never     Family History   Problem Relation Age of Onset   • Stroke Mother          of CVA at 74   • Heart Disease Mother         CABG/Valve at 70   • Other Father          at 28 MVA   • Lung Disease Sister         COPD   • Diabetes Sister    • Heart Disease Brother         3V CAD, going for valve and CABG     He  has no past medical history on file.   History reviewed. No pertinent surgical history.        Exam:     /64   Pulse 68   Temp 36.5 °C (97.7 °F) (Temporal)   Resp 18   Ht 1.727 m (5' 8\")   Wt 97.7 kg (215 lb 6.4 oz)   SpO2 96%  Body mass index is 32.75 kg/m².    Hearing excellent.    Dentition good  Alert, oriented in no acute distress.  Eye contact is good, speech goal directed, affect calm      Assessment and Plan. The following treatment and monitoring plan is recommended:      Thrombocytopenia, unspecified (HCC)  Chronic and stable condition   Was seen by Hem/onc and no need for follow up  Continue to monitor   Ref. Range 2021 09:40   Platelet Count Latest Ref Range: 164 - 446 K/uL 141 (L)       Olecranon bursitis of left elbow  Acute uncomplicated condition   Was seen at the ER for redness and swelling to left arm  History of right olecranon bursitis  Prescribed antibiotics that he is still on  Currently controlled with Bactrim and Keflex until 2021  Continue with Antibiotics    Essential " hypertension  Chronic and stable condition   States elevation at the UC and ER as well as the office  States home BP is in the high 120's   Currently controlled with Benazepril, carvedilol, nifedipine   Toan active with work now and has lost 10 lbs since september  Continue with medication regimen    Benign prostatic hyperplasia without lower urinary tract symptoms  Chronic and stable condition   Controlled with Hytrin 10mg daily   PSA 0.4 on 03/2021  Continue with Hytrin    Other male erectile dysfunction  Chronic and stable condition   Controlled with Cialis  Continue with Cialis as needed    Hyperlipidemia  Chronic and stable condition   Currently takes rosuvastatin 5mg   Continue with Rosuvastatin    Vitamin D deficiency  Chronic and stable condition   Currently takes Vitamin D 2000 IU daily   Continue with Vitamin D supplementation     Elevated fasting glucose  Chronic and stable condition   Controlled with diet and exercise   Continue with diet, exercise and lifestyle modifications    Prediabetes  Chronic and stable condition  Hemoglobin A1c 5.8  We will continue to monitor  Continue lifestyle modifications, diet exercise    Decreased GFR  Chronic and stable condition  We will continue to monitor  Results for DELMY RODNEY (MRN [id number]) as of 12/13/2021 11:45   Ref. Range 12/9/2021 09:40   GFR If Non  Latest Ref Range: >60 mL/min/1.73 m 2 57 (A)       Hyponatremia  Chronic and stable condition  We will continue to monitor  Results for DELMY RODNEY (MRN [id number]) as of 12/13/2021 11:45   Ref. Range 12/9/2021 09:40   Sodium Latest Ref Range: 135 - 145 mmol/L 132 (L)       Services suggested: No services needed at this time  Health Care Screening recommendations as per orders if indicated.  Referrals offered: PT/OT/Nutrition counseling/Behavioral Health/Smoking cessation as per orders if indicated.    Discussion today about general wellness and lifestyle habits:    · Prevent falls and  reduce trip hazards; Cautioned about securing or removing rugs.  · Have a working fire alarm and carbon monoxide detector;   · Engage in regular physical activity and social activities       Follow-up: Return in about 1 year (around 12/13/2022) for annual wellness visit with labs.

## 2021-12-13 NOTE — ASSESSMENT & PLAN NOTE
Chronic and stable condition   Controlled with Hytrin 10mg daily   PSA 0.4 on 03/2021  Continue with Hytrin

## 2021-12-13 NOTE — ASSESSMENT & PLAN NOTE
Chronic and stable condition   Currently takes Vitamin D 2000 IU daily   Continue with Vitamin D supplementation

## 2021-12-13 NOTE — ASSESSMENT & PLAN NOTE
Chronic and stable condition  We will continue to monitor  Results for GORAN RODNEYE LEOLA (MRN [id number]) as of 12/13/2021 11:45   Ref. Range 12/9/2021 09:40   Sodium Latest Ref Range: 135 - 145 mmol/L 132 (L)

## 2021-12-13 NOTE — ASSESSMENT & PLAN NOTE
Chronic and stable condition   Was seen by Hem/onc and no need for follow up  Continue to monitor   Ref. Range 12/9/2021 09:40   Platelet Count Latest Ref Range: 164 - 446 K/uL 141 (L)

## 2021-12-13 NOTE — ASSESSMENT & PLAN NOTE
Chronic and stable condition  We will continue to monitor  Results for RONEL DELMYCHANEL BHAGAT (MRN [id number]) as of 12/13/2021 11:45   Ref. Range 12/9/2021 09:40   GFR If Non  Latest Ref Range: >60 mL/min/1.73 m 2 57 (A)

## 2021-12-13 NOTE — ASSESSMENT & PLAN NOTE
Chronic and stable condition   Controlled with diet and exercise   Continue with diet, exercise and lifestyle modifications

## 2021-12-13 NOTE — ASSESSMENT & PLAN NOTE
Acute uncomplicated condition   Was seen at the ER for redness and swelling to left arm  History of right olecranon bursitis  Prescribed antibiotics that he is still on  Currently controlled with Bactrim and Keflex until 12/20/2021  Continue with Antibiotics

## 2022-03-22 ENCOUNTER — HOSPITAL ENCOUNTER (OUTPATIENT)
Dept: LAB | Facility: MEDICAL CENTER | Age: 67
End: 2022-03-22
Attending: UROLOGY
Payer: MEDICARE

## 2022-03-22 LAB — PSA SERPL-MCNC: 0.4 NG/ML (ref 0–4)

## 2022-03-22 PROCEDURE — 36415 COLL VENOUS BLD VENIPUNCTURE: CPT

## 2022-03-22 PROCEDURE — 84153 ASSAY OF PSA TOTAL: CPT

## 2022-03-31 ENCOUNTER — PATIENT MESSAGE (OUTPATIENT)
Dept: MEDICAL GROUP | Facility: PHYSICIAN GROUP | Age: 67
End: 2022-03-31
Payer: MEDICARE

## 2022-03-31 DIAGNOSIS — I10 ESSENTIAL HYPERTENSION: ICD-10-CM

## 2022-03-31 RX ORDER — CARVEDILOL 25 MG/1
25 TABLET ORAL 2 TIMES DAILY WITH MEALS
Qty: 180 TABLET | Refills: 1 | Status: SHIPPED | OUTPATIENT
Start: 2022-03-31 | End: 2022-11-08

## 2022-03-31 RX ORDER — NIFEDIPINE 30 MG/1
30 TABLET, EXTENDED RELEASE ORAL DAILY
Qty: 90 TABLET | Refills: 1 | Status: SHIPPED | OUTPATIENT
Start: 2022-03-31 | End: 2022-06-15

## 2022-04-08 ENCOUNTER — PATIENT MESSAGE (OUTPATIENT)
Dept: HEALTH INFORMATION MANAGEMENT | Facility: OTHER | Age: 67
End: 2022-04-08

## 2022-05-18 PROBLEM — R35.1 BENIGN PROSTATIC HYPERPLASIA WITH NOCTURIA: Status: ACTIVE | Noted: 2021-05-20

## 2022-05-18 PROBLEM — Z86.018 HISTORY OF PHEOCHROMOCYTOMA: Status: ACTIVE | Noted: 2022-05-18

## 2022-05-18 PROBLEM — R73.01 ELEVATED FASTING GLUCOSE: Status: RESOLVED | Noted: 2021-05-21 | Resolved: 2022-05-18

## 2022-05-18 PROBLEM — E66.9 OBESITY (BMI 30.0-34.9): Status: ACTIVE | Noted: 2022-05-18

## 2022-05-18 PROBLEM — I77.9 DISORDER OF ARTERIES AND ARTERIOLES, UNSPECIFIED (HCC): Status: ACTIVE | Noted: 2022-05-18

## 2022-05-18 PROBLEM — N40.1 BENIGN PROSTATIC HYPERPLASIA WITH NOCTURIA: Status: ACTIVE | Noted: 2021-05-20

## 2022-05-18 PROBLEM — E66.811 OBESITY (BMI 30.0-34.9): Status: ACTIVE | Noted: 2022-05-18

## 2022-05-18 PROBLEM — G62.9 NEUROPATHY: Status: ACTIVE | Noted: 2022-05-18

## 2022-06-15 ENCOUNTER — OFFICE VISIT (OUTPATIENT)
Dept: CARDIOLOGY | Facility: MEDICAL CENTER | Age: 67
End: 2022-06-15
Payer: MEDICARE

## 2022-06-15 VITALS
DIASTOLIC BLOOD PRESSURE: 80 MMHG | SYSTOLIC BLOOD PRESSURE: 168 MMHG | HEIGHT: 68 IN | BODY MASS INDEX: 31.83 KG/M2 | OXYGEN SATURATION: 98 % | WEIGHT: 210 LBS | RESPIRATION RATE: 17 BRPM | HEART RATE: 52 BPM

## 2022-06-15 DIAGNOSIS — E78.00 PURE HYPERCHOLESTEROLEMIA: ICD-10-CM

## 2022-06-15 DIAGNOSIS — I10 HTN (HYPERTENSION), MALIGNANT: ICD-10-CM

## 2022-06-15 DIAGNOSIS — I25.10 CORONARY ARTERY CALCIFICATION SEEN ON CT SCAN: ICD-10-CM

## 2022-06-15 DIAGNOSIS — Z82.49 FAMILY HISTORY OF AORTIC STENOSIS: ICD-10-CM

## 2022-06-15 DIAGNOSIS — I49.1 PREMATURE ATRIAL CONTRACTION: ICD-10-CM

## 2022-06-15 PROCEDURE — 99214 OFFICE O/P EST MOD 30 MIN: CPT | Performed by: INTERNAL MEDICINE

## 2022-06-15 RX ORDER — NIFEDIPINE 30 MG/1
1 TABLET, EXTENDED RELEASE ORAL DAILY
COMMUNITY
Start: 2022-03-31 | End: 2022-06-15

## 2022-06-15 RX ORDER — CEPHALEXIN 500 MG/1
CAPSULE ORAL
COMMUNITY
Start: 2022-05-24 | End: 2022-06-15

## 2022-06-15 RX ORDER — SULFAMETHOXAZOLE AND TRIMETHOPRIM 800; 160 MG/1; MG/1
1 TABLET ORAL 2 TIMES DAILY
COMMUNITY
Start: 2022-05-24 | End: 2022-06-15

## 2022-06-15 ASSESSMENT — ENCOUNTER SYMPTOMS
VOMITING: 0
ABDOMINAL PAIN: 0
ORTHOPNEA: 0
EYE DISCHARGE: 0
CHILLS: 0
FEVER: 0
BLOOD IN STOOL: 0
SHORTNESS OF BREATH: 0
EYE PAIN: 0
SENSORY CHANGE: 0
COUGH: 0
LOSS OF CONSCIOUSNESS: 0
HEADACHES: 0
WEIGHT LOSS: 0
FALLS: 0
BLURRED VISION: 0
DOUBLE VISION: 0
NAUSEA: 0
MYALGIAS: 0
PALPITATIONS: 0
BRUISES/BLEEDS EASILY: 0
DIZZINESS: 0
HALLUCINATIONS: 0
CLAUDICATION: 0
DEPRESSION: 0
PND: 0
SPEECH CHANGE: 0

## 2022-06-15 ASSESSMENT — FIBROSIS 4 INDEX: FIB4 SCORE: 1.81

## 2022-06-15 NOTE — PROGRESS NOTES
Chief Complaint   Patient presents with    Hypertension     F/V DX: Essential hypertension        Subjective     Antolin Omer is a 66 y.o. male who presents today management of HTN, HLP.    In the interim, patient has been doing well without having any symptoms. Patient denies having chest pain, dyspnea, palpitation, presyncope, syncope episodes. Able to climb up at least 2 flights of stairs.      History reviewed. No pertinent past medical history.  History reviewed. No pertinent surgical history.  Family History   Problem Relation Age of Onset    Stroke Mother          of CVA at 74    Heart Disease Mother         CABG/Valve at 70    Other Father          at 28 MVA    Lung Disease Sister         COPD    Diabetes Sister     Heart Disease Brother         3V CAD, going for valve and CABG     Social History     Socioeconomic History    Marital status:      Spouse name: Not on file    Number of children: Not on file    Years of education: Not on file    Highest education level: Not on file   Occupational History    Occupation: retail   Tobacco Use    Smoking status: Former Smoker     Packs/day: 0.25     Years: 2.00     Pack years: 0.50     Types: Cigarettes     Quit date:      Years since quittin.4    Smokeless tobacco: Never Used   Vaping Use    Vaping Use: Never used   Substance and Sexual Activity    Alcohol use: Yes     Alcohol/week: 2.4 - 3.0 oz     Types: 4 - 5 Cans of beer per week     Comment: occ    Drug use: Never    Sexual activity: Yes   Other Topics Concern    Not on file   Social History Narrative    Not on file     Social Determinants of Health     Financial Resource Strain: Not on file   Food Insecurity: Not on file   Transportation Needs: Not on file   Physical Activity: Not on file   Stress: Not on file   Social Connections: Not on file   Intimate Partner Violence: Not on file   Housing Stability: Not on file     No Known Allergies  Outpatient Encounter Medications as of  6/15/2022   Medication Sig Dispense Refill    NIFEdipine (ADALAT CC) 60 MG CR tablet Take 1 Tablet by mouth every day. 90 Tablet 3    Pyridoxine HCl (VITAMIN B-6 PO) Take 1 Tablet by mouth every day. Unable to recall dose      Cyanocobalamin (VITAMIN B-12 PO) Take 1 Tablet by mouth. 500 mcg      aspirin EC (ECOTRIN) 81 MG Tablet Delayed Response Take 81 mg by mouth every day.      carvedilol (COREG) 25 MG Tab Take 1 Tablet by mouth 2 times a day with meals. 180 Tablet 1    terazosin (HYTRIN) 10 MG capsule Take 1 Capsule by mouth every day. 90 Capsule 2    benazepril (LOTENSIN) 40 MG tablet Take  by mouth every day.      vitamin D (VITAMIND D3) 1000 UNIT Tab Take 2,000 Units by mouth every day.      rosuvastatin (CRESTOR) 5 MG Tab Take  by mouth every day.      tadalafil (CIALIS) 5 MG tablet Take 2.5 mg by mouth.      [DISCONTINUED] sulfamethoxazole-trimethoprim (BACTRIM DS) 800-160 MG tablet Take 1 Tablet by mouth 2 times a day. FOR 10 DAYS (Patient not taking: Reported on 6/15/2022)      [DISCONTINUED] NIFEdipine SR (PROCARDIA-XL) 30 MG tablet Take 1 Tablet by mouth every day. (Patient not taking: Reported on 6/15/2022)      [DISCONTINUED] cephALEXin (KEFLEX) 500 MG Cap TAKE 1 CAPSULE BY MOUTH 4 TIMES DAILY FOR 10 DAYS (Patient not taking: Reported on 6/15/2022)      [DISCONTINUED] NIFEdipine SR (PROCARDIA-XL) 30 MG tablet Take 1 Tablet by mouth every day. 90 Tablet 1     No facility-administered encounter medications on file as of 6/15/2022.     Review of Systems   Constitutional: Negative for chills, fever, malaise/fatigue and weight loss.   HENT: Negative for ear discharge, ear pain, hearing loss and nosebleeds.    Eyes: Negative for blurred vision, double vision, pain and discharge.   Respiratory: Negative for cough and shortness of breath.    Cardiovascular: Negative for chest pain, palpitations, orthopnea, claudication, leg swelling and PND.   Gastrointestinal: Negative for abdominal pain, blood in stool,  "melena, nausea and vomiting.   Genitourinary: Negative for dysuria and hematuria.   Musculoskeletal: Negative for falls, joint pain and myalgias.   Skin: Negative for itching and rash.   Neurological: Negative for dizziness, sensory change, speech change, loss of consciousness and headaches.   Endo/Heme/Allergies: Negative for environmental allergies. Does not bruise/bleed easily.   Psychiatric/Behavioral: Negative for depression, hallucinations and suicidal ideas.              Objective     BP (!) 168/80 (BP Location: Left arm, Patient Position: Sitting, BP Cuff Size: Adult)   Pulse (!) 52   Resp 17   Ht 1.727 m (5' 8\")   Wt 95.3 kg (210 lb)   SpO2 98%   BMI 31.93 kg/m²     Physical Exam  Vitals and nursing note reviewed.   Constitutional:       General: He is not in acute distress.     Appearance: He is not diaphoretic.   HENT:      Head: Normocephalic and atraumatic.      Right Ear: External ear normal.      Left Ear: External ear normal.      Nose: No congestion or rhinorrhea.   Eyes:      General:         Right eye: No discharge.         Left eye: No discharge.   Neck:      Thyroid: No thyromegaly.      Vascular: No JVD.   Cardiovascular:      Rate and Rhythm: Normal rate and regular rhythm.      Pulses: Normal pulses.   Pulmonary:      Effort: No respiratory distress.   Abdominal:      General: There is no distension.      Tenderness: There is no abdominal tenderness.   Musculoskeletal:         General: No swelling or tenderness.      Right lower leg: No edema.      Left lower leg: No edema.   Skin:     General: Skin is warm and dry.   Neurological:      Mental Status: He is alert and oriented to person, place, and time.      Cranial Nerves: No cranial nerve deficit.   Psychiatric:         Behavior: Behavior normal.         Assessment & Plan     1. Coronary artery calcification seen on CT scan     2. Premature atrial contraction     3. HTN (hypertension), malignant     4. Pure hypercholesterolemia  Basic " Metabolic Panel    LIPID PANEL   5. Family history of aortic stenosis         Medical Decision Making: Today's Assessment/Status/Plan:   Blood pressure is high.    Will increase Nifedipine to 60 mg daily.    Continue Carvedilol 25 mg bid, Benazepril 40 mg daily.    Continue Rosuvastatin 5 mg daily.

## 2022-10-04 ENCOUNTER — NON-PROVIDER VISIT (OUTPATIENT)
Dept: MEDICAL GROUP | Facility: PHYSICIAN GROUP | Age: 67
End: 2022-10-04
Payer: MEDICARE

## 2022-10-04 DIAGNOSIS — Z23 NEED FOR VACCINATION: ICD-10-CM

## 2022-10-04 PROCEDURE — 90662 IIV NO PRSV INCREASED AG IM: CPT | Performed by: STUDENT IN AN ORGANIZED HEALTH CARE EDUCATION/TRAINING PROGRAM

## 2022-10-04 PROCEDURE — G0008 ADMIN INFLUENZA VIRUS VAC: HCPCS | Performed by: STUDENT IN AN ORGANIZED HEALTH CARE EDUCATION/TRAINING PROGRAM

## 2022-10-07 DIAGNOSIS — I10 ESSENTIAL HYPERTENSION: ICD-10-CM

## 2022-10-07 NOTE — TELEPHONE ENCOUNTER
Received request via: Patient    Was the patient seen in the last year in this department? Yes    Does the patient have an active prescription (recently filled or refills available) for medication(s) requested? No    Patient requests *1/2 a tab in the morning and 1/2 a tab in the evening*

## 2022-10-08 RX ORDER — BENAZEPRIL HYDROCHLORIDE 40 MG/1
40 TABLET ORAL DAILY
Qty: 30 TABLET | Refills: 0 | Status: SHIPPED | OUTPATIENT
Start: 2022-10-08 | End: 2022-11-08

## 2022-11-06 DIAGNOSIS — I10 ESSENTIAL HYPERTENSION: ICD-10-CM

## 2022-11-08 ENCOUNTER — PATIENT MESSAGE (OUTPATIENT)
Dept: HEALTH INFORMATION MANAGEMENT | Facility: OTHER | Age: 67
End: 2022-11-08

## 2022-11-08 RX ORDER — BENAZEPRIL HYDROCHLORIDE 40 MG/1
TABLET ORAL
Qty: 30 TABLET | Refills: 0 | Status: SHIPPED | OUTPATIENT
Start: 2022-11-08 | End: 2022-11-16 | Stop reason: SDUPTHER

## 2022-11-08 RX ORDER — CARVEDILOL 25 MG/1
TABLET ORAL
Qty: 180 TABLET | Refills: 0 | Status: SHIPPED | OUTPATIENT
Start: 2022-11-08 | End: 2022-11-16 | Stop reason: SDUPTHER

## 2022-11-10 ENCOUNTER — HOSPITAL ENCOUNTER (OUTPATIENT)
Dept: LAB | Facility: MEDICAL CENTER | Age: 67
End: 2022-11-10
Attending: INTERNAL MEDICINE
Payer: MEDICARE

## 2022-11-10 LAB
CHOLEST SERPL-MCNC: 127 MG/DL (ref 100–199)
HDLC SERPL-MCNC: 64 MG/DL
LDLC SERPL CALC-MCNC: 51 MG/DL
TRIGL SERPL-MCNC: 61 MG/DL (ref 0–149)

## 2022-11-10 PROCEDURE — 36415 COLL VENOUS BLD VENIPUNCTURE: CPT

## 2022-11-10 PROCEDURE — 80061 LIPID PANEL: CPT

## 2022-11-16 ENCOUNTER — OFFICE VISIT (OUTPATIENT)
Dept: CARDIOLOGY | Facility: MEDICAL CENTER | Age: 67
End: 2022-11-16
Payer: MEDICARE

## 2022-11-16 VITALS
OXYGEN SATURATION: 98 % | WEIGHT: 212 LBS | HEIGHT: 68 IN | RESPIRATION RATE: 16 BRPM | HEART RATE: 63 BPM | SYSTOLIC BLOOD PRESSURE: 142 MMHG | DIASTOLIC BLOOD PRESSURE: 70 MMHG | BODY MASS INDEX: 32.13 KG/M2

## 2022-11-16 DIAGNOSIS — I10 HTN (HYPERTENSION), MALIGNANT: ICD-10-CM

## 2022-11-16 DIAGNOSIS — I25.10 CORONARY ARTERY CALCIFICATION SEEN ON CT SCAN: ICD-10-CM

## 2022-11-16 DIAGNOSIS — I49.1 PREMATURE ATRIAL CONTRACTION: ICD-10-CM

## 2022-11-16 DIAGNOSIS — I10 ESSENTIAL HYPERTENSION: ICD-10-CM

## 2022-11-16 PROCEDURE — 99214 OFFICE O/P EST MOD 30 MIN: CPT | Performed by: INTERNAL MEDICINE

## 2022-11-16 RX ORDER — NIFEDIPINE 90 MG/1
90 TABLET, EXTENDED RELEASE ORAL DAILY
Qty: 90 TABLET | Refills: 4 | Status: SHIPPED | OUTPATIENT
Start: 2022-11-16 | End: 2023-05-18 | Stop reason: SDUPTHER

## 2022-11-16 RX ORDER — BENAZEPRIL HYDROCHLORIDE 40 MG/1
40 TABLET ORAL DAILY
Qty: 90 TABLET | Refills: 4 | Status: SHIPPED | OUTPATIENT
Start: 2022-11-16 | End: 2023-03-13 | Stop reason: SDUPTHER

## 2022-11-16 RX ORDER — ROSUVASTATIN CALCIUM 5 MG/1
5 TABLET, COATED ORAL DAILY
Qty: 100 TABLET | Refills: 4 | Status: SHIPPED | OUTPATIENT
Start: 2022-11-16 | End: 2023-05-18 | Stop reason: SDUPTHER

## 2022-11-16 RX ORDER — CARVEDILOL 25 MG/1
25 TABLET ORAL 2 TIMES DAILY WITH MEALS
Qty: 180 TABLET | Refills: 4 | Status: SHIPPED | OUTPATIENT
Start: 2022-11-16 | End: 2023-05-18 | Stop reason: SDUPTHER

## 2022-11-16 ASSESSMENT — ENCOUNTER SYMPTOMS
NAUSEA: 0
ABDOMINAL PAIN: 0
SENSORY CHANGE: 0
FEVER: 0
BLOOD IN STOOL: 0
CLAUDICATION: 0
WEIGHT LOSS: 0
EYE PAIN: 0
HALLUCINATIONS: 0
COUGH: 0
DOUBLE VISION: 0
PND: 0
BRUISES/BLEEDS EASILY: 0
PALPITATIONS: 0
FALLS: 0
SHORTNESS OF BREATH: 0
SPEECH CHANGE: 0
MYALGIAS: 0
BLURRED VISION: 0
DIZZINESS: 0
DEPRESSION: 0
HEADACHES: 0
CHILLS: 0
ORTHOPNEA: 0
EYE DISCHARGE: 0
VOMITING: 0
LOSS OF CONSCIOUSNESS: 0

## 2022-11-16 ASSESSMENT — FIBROSIS 4 INDEX: FIB4 SCORE: 1.84

## 2022-11-16 NOTE — PROGRESS NOTES
Chief Complaint   Patient presents with    Hypertension    Dyslipidemia       Subjective     Antolin Omer is a 66 y.o. male who presents today for management of HTN, HLP.    I have independently interpreted and reviewed blood tests results with patient in clinic which shows normal LDL level 51, triglycerides 61, renal and liver function.    In the interim, patient has been doing well without having any symptoms. Patient denies having chest pain, dyspnea, palpitation, presyncope, syncope episodes. Able to climb up at least 2 flights of stairs.      No past medical history on file.  No past surgical history on file.  Family History   Problem Relation Age of Onset    Stroke Mother          of CVA at 74    Heart Disease Mother         CABG/Valve at 70    Other Father          at 28 MVA    Lung Disease Sister         COPD    Diabetes Sister     Heart Disease Brother         3V CAD, going for valve and CABG     Social History     Socioeconomic History    Marital status:      Spouse name: Not on file    Number of children: Not on file    Years of education: Not on file    Highest education level: Not on file   Occupational History    Occupation: retail   Tobacco Use    Smoking status: Former     Packs/day: 0.25     Years: 2.00     Pack years: 0.50     Types: Cigarettes     Quit date:      Years since quittin.9    Smokeless tobacco: Never   Vaping Use    Vaping Use: Never used   Substance and Sexual Activity    Alcohol use: Yes     Alcohol/week: 2.4 - 3.0 oz     Types: 4 - 5 Cans of beer per week     Comment: occ    Drug use: Never    Sexual activity: Yes   Other Topics Concern    Not on file   Social History Narrative    Not on file     Social Determinants of Health     Financial Resource Strain: Not on file   Food Insecurity: Not on file   Transportation Needs: Not on file   Physical Activity: Not on file   Stress: Not on file   Social Connections: Not on file   Intimate Partner Violence: Not  on file   Housing Stability: Not on file     No Known Allergies  Outpatient Encounter Medications as of 11/16/2022   Medication Sig Dispense Refill    carvedilol (COREG) 25 MG Tab TAKE 1 TABLET BY MOUTH TWICE DAILY WITH MEALS 180 Tablet 0    benazepril (LOTENSIN) 40 MG tablet Take 1 tablet by mouth once daily 30 Tablet 0    NIFEdipine (ADALAT CC) 60 MG CR tablet Take 1 Tablet by mouth every day. 90 Tablet 3    Pyridoxine HCl (VITAMIN B-6 PO) Take 1 Tablet by mouth every day. Unable to recall dose      Cyanocobalamin (VITAMIN B-12 PO) Take 1 Tablet by mouth. 500 mcg      aspirin EC (ECOTRIN) 81 MG Tablet Delayed Response Take 1 Tablet by mouth every day.      terazosin (HYTRIN) 10 MG capsule Take 1 Capsule by mouth every day. 90 Capsule 2    vitamin D (VITAMIND D3) 1000 UNIT Tab Take 2 Tablets by mouth every day.      rosuvastatin (CRESTOR) 5 MG Tab Take  by mouth every day.      tadalafil (CIALIS) 5 MG tablet Take 0.5 Tablets by mouth.       No facility-administered encounter medications on file as of 11/16/2022.     Review of Systems   Constitutional:  Negative for chills, fever, malaise/fatigue and weight loss.   HENT:  Negative for ear discharge, ear pain, hearing loss and nosebleeds.    Eyes:  Negative for blurred vision, double vision, pain and discharge.   Respiratory:  Negative for cough and shortness of breath.    Cardiovascular:  Negative for chest pain, palpitations, orthopnea, claudication, leg swelling and PND.   Gastrointestinal:  Negative for abdominal pain, blood in stool, melena, nausea and vomiting.   Genitourinary:  Negative for dysuria and hematuria.   Musculoskeletal:  Negative for falls, joint pain and myalgias.   Skin:  Negative for itching and rash.   Neurological:  Negative for dizziness, sensory change, speech change, loss of consciousness and headaches.   Endo/Heme/Allergies:  Negative for environmental allergies. Does not bruise/bleed easily.   Psychiatric/Behavioral:  Negative for  "depression, hallucinations and suicidal ideas.             Objective     BP (!) 142/70 (BP Location: Left arm, Patient Position: Sitting, BP Cuff Size: Adult)   Pulse 63   Resp 16   Ht 1.727 m (5' 8\")   Wt 96.2 kg (212 lb)   SpO2 98%   BMI 32.23 kg/m²     Physical Exam  Vitals and nursing note reviewed.   Constitutional:       General: He is not in acute distress.     Appearance: He is not diaphoretic.   HENT:      Head: Normocephalic and atraumatic.      Right Ear: External ear normal.      Left Ear: External ear normal.      Nose: No congestion or rhinorrhea.   Eyes:      General:         Right eye: No discharge.         Left eye: No discharge.   Neck:      Thyroid: No thyromegaly.      Vascular: No JVD.   Cardiovascular:      Rate and Rhythm: Normal rate and regular rhythm.      Pulses: Normal pulses.   Pulmonary:      Effort: No respiratory distress.   Abdominal:      General: There is no distension.      Tenderness: There is no abdominal tenderness.   Musculoskeletal:         General: No swelling or tenderness.      Right lower leg: No edema.      Left lower leg: No edema.   Skin:     General: Skin is warm and dry.   Neurological:      Mental Status: He is alert and oriented to person, place, and time.      Cranial Nerves: No cranial nerve deficit.   Psychiatric:         Behavior: Behavior normal.       Assessment & Plan     1. Coronary artery calcification seen on CT scan        2. HTN (hypertension), malignant        3. Premature atrial contraction            Medical Decision Making: Today's Assessment/Status/Plan:   Blood pressure is high.    Will increase Nifedipine to 90 mg daily.    Continue Carvedilol 25 mg bid, Benazepril 40 mg daily.    Continue Rosuvastatin 5 mg daily.                    "

## 2022-11-23 ENCOUNTER — DOCUMENTATION (OUTPATIENT)
Dept: HEALTH INFORMATION MANAGEMENT | Facility: OTHER | Age: 67
End: 2022-11-23
Payer: MEDICARE

## 2022-12-14 ENCOUNTER — TELEPHONE (OUTPATIENT)
Dept: MEDICAL GROUP | Facility: PHYSICIAN GROUP | Age: 67
End: 2022-12-14
Payer: MEDICARE

## 2022-12-14 DIAGNOSIS — I10 ESSENTIAL HYPERTENSION: ICD-10-CM

## 2022-12-14 DIAGNOSIS — R73.03 PREDIABETES: ICD-10-CM

## 2022-12-14 DIAGNOSIS — E87.1 HYPONATREMIA: ICD-10-CM

## 2022-12-14 DIAGNOSIS — D69.6 THROMBOCYTOPENIA, UNSPECIFIED (HCC): ICD-10-CM

## 2022-12-14 DIAGNOSIS — R94.4 DECREASED GFR: ICD-10-CM

## 2022-12-14 NOTE — TELEPHONE ENCOUNTER
Pt called wondering if PCP would like for him to complete any blood work before his appointment. Appt 12/21/2022    Pt would like a call back.

## 2022-12-14 NOTE — TELEPHONE ENCOUNTER
Called pt to let him know of lab orders for future appt 12/21/22. I stated he needs to fast for this order, he understands.

## 2022-12-16 ENCOUNTER — HOSPITAL ENCOUNTER (OUTPATIENT)
Dept: LAB | Facility: MEDICAL CENTER | Age: 67
End: 2022-12-16
Attending: NURSE PRACTITIONER
Payer: MEDICARE

## 2022-12-16 DIAGNOSIS — R94.4 DECREASED GFR: ICD-10-CM

## 2022-12-16 DIAGNOSIS — I10 ESSENTIAL HYPERTENSION: ICD-10-CM

## 2022-12-16 DIAGNOSIS — E87.1 HYPONATREMIA: ICD-10-CM

## 2022-12-16 DIAGNOSIS — R73.03 PREDIABETES: ICD-10-CM

## 2022-12-16 DIAGNOSIS — D69.6 THROMBOCYTOPENIA, UNSPECIFIED (HCC): ICD-10-CM

## 2022-12-16 LAB
ALBUMIN SERPL BCP-MCNC: 4.4 G/DL (ref 3.2–4.9)
ALBUMIN/GLOB SERPL: 1.9 G/DL
ALP SERPL-CCNC: 56 U/L (ref 30–99)
ALT SERPL-CCNC: 13 U/L (ref 2–50)
ANION GAP SERPL CALC-SCNC: 9 MMOL/L (ref 7–16)
AST SERPL-CCNC: 16 U/L (ref 12–45)
BILIRUB SERPL-MCNC: 0.7 MG/DL (ref 0.1–1.5)
BUN SERPL-MCNC: 16 MG/DL (ref 8–22)
CALCIUM ALBUM COR SERPL-MCNC: 9.4 MG/DL (ref 8.5–10.5)
CALCIUM SERPL-MCNC: 9.7 MG/DL (ref 8.5–10.5)
CHLORIDE SERPL-SCNC: 101 MMOL/L (ref 96–112)
CO2 SERPL-SCNC: 24 MMOL/L (ref 20–33)
CREAT SERPL-MCNC: 1.23 MG/DL (ref 0.5–1.4)
ERYTHROCYTE [DISTWIDTH] IN BLOOD BY AUTOMATED COUNT: 40.2 FL (ref 35.9–50)
GFR SERPLBLD CREATININE-BSD FMLA CKD-EPI: 63 ML/MIN/1.73 M 2
GLOBULIN SER CALC-MCNC: 2.3 G/DL (ref 1.9–3.5)
GLUCOSE SERPL-MCNC: 140 MG/DL (ref 65–99)
HCT VFR BLD AUTO: 40.5 % (ref 42–52)
HGB BLD-MCNC: 14.3 G/DL (ref 14–18)
MCH RBC QN AUTO: 30.9 PG (ref 27–33)
MCHC RBC AUTO-ENTMCNC: 35.3 G/DL (ref 33.7–35.3)
MCV RBC AUTO: 87.5 FL (ref 81.4–97.8)
PLATELET # BLD AUTO: 125 K/UL (ref 164–446)
PMV BLD AUTO: 9.6 FL (ref 9–12.9)
POTASSIUM SERPL-SCNC: 5.2 MMOL/L (ref 3.6–5.5)
PROT SERPL-MCNC: 6.7 G/DL (ref 6–8.2)
RBC # BLD AUTO: 4.63 M/UL (ref 4.7–6.1)
SODIUM SERPL-SCNC: 134 MMOL/L (ref 135–145)
WBC # BLD AUTO: 6.5 K/UL (ref 4.8–10.8)

## 2022-12-16 PROCEDURE — 85027 COMPLETE CBC AUTOMATED: CPT

## 2022-12-16 PROCEDURE — 80053 COMPREHEN METABOLIC PANEL: CPT

## 2022-12-16 PROCEDURE — 36415 COLL VENOUS BLD VENIPUNCTURE: CPT

## 2022-12-21 ENCOUNTER — OFFICE VISIT (OUTPATIENT)
Dept: MEDICAL GROUP | Facility: PHYSICIAN GROUP | Age: 67
End: 2022-12-21
Payer: MEDICARE

## 2022-12-21 VITALS
SYSTOLIC BLOOD PRESSURE: 138 MMHG | OXYGEN SATURATION: 96 % | HEART RATE: 50 BPM | TEMPERATURE: 97.5 F | WEIGHT: 217 LBS | HEIGHT: 68 IN | BODY MASS INDEX: 32.89 KG/M2 | RESPIRATION RATE: 14 BRPM | DIASTOLIC BLOOD PRESSURE: 56 MMHG

## 2022-12-21 DIAGNOSIS — E55.9 VITAMIN D DEFICIENCY: ICD-10-CM

## 2022-12-21 DIAGNOSIS — G62.9 NEUROPATHY: ICD-10-CM

## 2022-12-21 DIAGNOSIS — D69.6 THROMBOCYTOPENIA, UNSPECIFIED (HCC): ICD-10-CM

## 2022-12-21 DIAGNOSIS — I10 ESSENTIAL HYPERTENSION: ICD-10-CM

## 2022-12-21 DIAGNOSIS — R94.4 DECREASED GFR: ICD-10-CM

## 2022-12-21 DIAGNOSIS — N52.8 OTHER MALE ERECTILE DYSFUNCTION: ICD-10-CM

## 2022-12-21 DIAGNOSIS — E78.5 DYSLIPIDEMIA: ICD-10-CM

## 2022-12-21 DIAGNOSIS — I77.9 DISORDER OF ARTERIES AND ARTERIOLES, UNSPECIFIED (HCC): ICD-10-CM

## 2022-12-21 DIAGNOSIS — R35.1 BENIGN PROSTATIC HYPERPLASIA WITH NOCTURIA: ICD-10-CM

## 2022-12-21 DIAGNOSIS — E87.1 HYPONATREMIA: ICD-10-CM

## 2022-12-21 DIAGNOSIS — Z00.00 ENCOUNTER FOR SUBSEQUENT ANNUAL WELLNESS VISIT (AWV) IN MEDICARE PATIENT: ICD-10-CM

## 2022-12-21 DIAGNOSIS — N40.1 BENIGN PROSTATIC HYPERPLASIA WITH NOCTURIA: ICD-10-CM

## 2022-12-21 DIAGNOSIS — R73.03 PREDIABETES: ICD-10-CM

## 2022-12-21 DIAGNOSIS — E66.9 OBESITY (BMI 30.0-34.9): ICD-10-CM

## 2022-12-21 PROBLEM — M70.22 OLECRANON BURSITIS OF LEFT ELBOW: Status: RESOLVED | Noted: 2021-12-13 | Resolved: 2022-12-21

## 2022-12-21 PROCEDURE — G0439 PPPS, SUBSEQ VISIT: HCPCS | Performed by: NURSE PRACTITIONER

## 2022-12-21 ASSESSMENT — ENCOUNTER SYMPTOMS: GENERAL WELL-BEING: GOOD

## 2022-12-21 ASSESSMENT — PATIENT HEALTH QUESTIONNAIRE - PHQ9: CLINICAL INTERPRETATION OF PHQ2 SCORE: 0

## 2022-12-21 ASSESSMENT — FIBROSIS 4 INDEX: FIB4 SCORE: 2.45

## 2022-12-21 ASSESSMENT — ACTIVITIES OF DAILY LIVING (ADL): BATHING_REQUIRES_ASSISTANCE: 0

## 2022-12-21 NOTE — PROGRESS NOTES
Chief Complaint   Patient presents with    Medicare Annual Wellness       HPI:  Antolin is a 67 y.o. here for Medicare Annual Wellness Visit        Patient Active Problem List    Diagnosis Date Noted    Obesity (BMI 30.0-34.9) 05/18/2022    Neuropathy 05/18/2022    Disorder of arteries and arterioles, unspecified ([facility]) 05/18/2022    Hyponatremia 06/16/2021    Prediabetes 05/21/2021    Decreased GFR 05/21/2021    Essential hypertension 05/20/2021    Benign prostatic hyperplasia with nocturia 05/20/2021    Other male erectile dysfunction 05/20/2021    Dyslipidemia 05/20/2021    Vitamin D deficiency 05/20/2021    Thrombocytopenia, unspecified ([facility]) 04/12/2021       Current Outpatient Medications   Medication Sig Dispense Refill    NIFEdipine SR (PROCADIA-XL) 90 MG CR tablet Take 1 Tablet by mouth every day. 90 Tablet 4    carvedilol (COREG) 25 MG Tab Take 1 Tablet by mouth 2 times a day with meals. 180 Tablet 4    benazepril (LOTENSIN) 40 MG tablet Take 1 Tablet by mouth every day. 90 Tablet 4    rosuvastatin (CRESTOR) 5 MG Tab Take 1 Tablet by mouth every day. 100 Tablet 4    Pyridoxine HCl (VITAMIN B-6 PO) Take 1 Tablet by mouth every day. Unable to recall dose      Cyanocobalamin (VITAMIN B-12 PO) Take 1 Tablet by mouth. 500 mcg      aspirin EC (ECOTRIN) 81 MG Tablet Delayed Response Take 1 Tablet by mouth every day.      terazosin (HYTRIN) 10 MG capsule Take 1 Capsule by mouth every day. 90 Capsule 2    vitamin D (VITAMIND D3) 1000 UNIT Tab Take 2 Tablets by mouth every day.      tadalafil (CIALIS) 5 MG tablet Take 0.5 Tablets by mouth.       No current facility-administered medications for this visit.        Patient is taking medications as noted in medication list.  Current supplements as per medication list.     Allergies: Patient has no known allergies.    Current social contact/activities:    Was going to gym prior to his wife getting sick   Continues to work   Hangs out with his grandkids 9, 11    Is patient  current with immunizations? Yes.    He  reports that he quit smoking about 43 years ago. His smoking use included cigarettes. He has a 0.50 pack-year smoking history. He has never used smokeless tobacco. He reports current alcohol use of about 2.4 - 3.0 oz per week. He reports that he does not use drugs.  Counseling given: Not Answered      ROS:    Gait: Uses no assistive device   Ostomy: No   Other tubes: No   Amputations: No   Chronic oxygen use No   Last eye exam 2017   Wears hearing aids: No   : Denies any urinary leakage during the last 6 months    Screening:    Depression Screening  Little interest or pleasure in doing things?  0 - not at all  Feeling down, depressed, or hopeless? 0 - not at all  Patient Health Questionnaire Score: 0    If depressive symptoms identified deferred to follow up visit unless specifically addressed in assessment and plan.    Interpretation of PHQ-9 Total Score   Score Severity   1-4 No Depression   5-9 Mild Depression   10-14 Moderate Depression   15-19 Moderately Severe Depression   20-27 Severe Depression    Screening for Cognitive Impairment  Three Minute Recall (daughter, heaven, mountain)  3/3    Hugo clock face with all 12 numbers and set the hands to show 10 past 11.  Yes    If cognitive concerns identified, deferred for follow up unless specifically addressed in assessment and plan.    Fall Risk Assessment  Has the patient had two or more falls in the last year or any fall with injury in the last year?  No  If fall risk identified, deferred for follow up unless specifically addressed in assessment and plan.    Safety Assessment  Throw rugs on floor.  Yes  Handrails on all stairs.  No  Good lighting in all hallways.  Yes  Difficulty hearing.  Yes  Patient counseled about all safety risks that were identified.    Functional Assessment ADLs  Are there any barriers preventing you from cooking for yourself or meeting nutritional needs?  No.    Are there any barriers preventing  you from driving safely or obtaining transportation?  No.    Are there any barriers preventing you from using a telephone or calling for help?  No.    Are there any barriers preventing you from shopping?  No.    Are there any barriers preventing you from taking care of your own finances?  No.    Are there any barriers preventing you from managing your medications?  No.    Are there any barriers preventing you from showering, bathing or dressing yourself?  No.    Are you currently engaging in any exercise or physical activity?  Yes.     What is your perception of your health?  Good.    Advance Care Planning  Do you have an Advance Directive, Living Will, Durable Power of , or POLST?                 Health Maintenance Summary            Overdue - IMM ZOSTER VACCINES (1 of 2) Overdue - never done      No completion history exists for this topic.              Overdue - ABDOMINAL AORTIC ANEURYSM (AAA) SCREEN (Once) Overdue - never done      No completion history exists for this topic.              Overdue - COVID-19 Vaccine (4 - Booster for Moderna series) Overdue since 1/14/2022 11/19/2021  Imm Admin: PFIZER PURPLE CAP SARS-COV-2 VACCINATION (12+)    04/16/2021  Imm Admin: MODERNA SARS-COV-2 VACCINE (12+)    03/19/2021  Imm Admin: MODERNA SARS-COV-2 VACCINE (12+)              Annual Wellness Visit (Every 366 Days) Next due on 5/19/2023 05/18/2022  Level of Service: [place] ANNUAL WELLNESS VISIT-INCLUDES PPPS SUBSEQUE*    12/13/2021  Done              COLORECTAL CANCER SCREENING (COLONOSCOPY - Every 10 Years) Next due on 6/7/2026 06/07/2016  COLONOSCOPY (Done)              IMM DTaP/Tdap/Td Vaccine (2 - Td or Tdap) Next due on 12/13/2031 12/13/2021  Imm Admin: Tdap Vaccine              IMM PNEUMOCOCCAL VACCINE: 65+ Years (Series Information) Completed      05/06/2020  Imm Admin: Pneumococcal polysaccharide vaccine (PPSV-23)    04/24/2019  Imm Admin: Pneumococcal Conjugate Vaccine (Prevnar/PCV-13)  "             IMM INFLUENZA (Series Information) Completed      10/04/2022  Imm Admin: Influenza Vaccine Adult HD    2021  Imm Admin: Influenza Vaccine Adult HD    10/05/2020  Imm Admin: Influenza Vac Subunit Quad Inj (Pf)    10/17/2019  Imm Admin: Influenza Vaccine Quad Inj (Preserved)    2018  Imm Admin: Influenza Vaccine Quad Inj (Pf)    Only the first 5 history entries have been loaded, but more history exists.              IMM HEP B VACCINE (Series Information) Aged Out      No completion history exists for this topic.              IMM MENINGOCOCCAL ACWY VACCINE (Series Information) Aged Out      No completion history exists for this topic.              Discontinued - HEPATITIS C SCREENING  Discontinued      No completion history exists for this topic.                    Patient Care Team:  KRISTEN Mccloud as PCP - General (Nurse Practitioner Primary Care)  Soraya Turcios M.D. (Cardiovascular Disease (Cardiology))  Bertin Paige M.D. (Urology)    Social History     Tobacco Use    Smoking status: Former     Packs/day: 0.25     Years: 2.00     Pack years: 0.50     Types: Cigarettes     Quit date:      Years since quittin.0    Smokeless tobacco: Never   Vaping Use    Vaping Use: Never used   Substance Use Topics    Alcohol use: Yes     Alcohol/week: 2.4 - 3.0 oz     Types: 4 - 5 Cans of beer per week     Comment: occ    Drug use: Never     Family History   Problem Relation Age of Onset    Stroke Mother          of CVA at 74    Heart Disease Mother         CABG/Valve at 70    Other Father          at 28 MVA    Lung Disease Sister         COPD    Diabetes Sister     Heart Disease Brother         3V CAD, going for valve and CABG     He  has no past medical history on file.   History reviewed. No pertinent surgical history.    Exam:   /56   Pulse (!) 50   Temp 36.4 °C (97.5 °F) (Temporal)   Resp 14   Ht 1.727 m (5' 8\")   Wt 98.4 kg (217 lb)   SpO2 96%  Body mass " index is 32.99 kg/m².    Hearing good.    Dentition good  Alert, oriented in no acute distress.  Eye contact is good, speech goal directed, affect calm      Assessment and Plan. The following treatment and monitoring plan is recommended:      Thrombocytopenia, unspecified (HCC)   Latest Reference Range & Units 12/09/21 09:40 12/16/22 08:42   Platelet Count 164 - 446 K/uL 141 (L) 125 (L)     Chronic and stable condition.  Labs since last year did show a decrease in his platelet count  Patient is previously seen hematology for this and was told there were no concerns  He does continue to be asymptomatic.    Disorder of arteries and arterioles, unspecified (HCC)  Chronic stable condition.    Continues to take ASA 81  Follows with Dr. Leavitt with [facility]     Vitamin D deficiency  Chronic and stable condition.    Patient continues to take over-the-counter vitamin D supplementation     Essential hypertension  Chronic and stable condition   Takes nifedipine 90 mg daily- which is an increase from 60mg to 90 mg, carvedilol 25mg BID, benazepril 40 mg daily   States since the change in medications he is feeling like his BP is lower than he would like    Other male erectile dysfunction  Chronic and stable condition.    Patient currently takes cialis 2.5mg daily   Notes he is able to sustain erections with notable issues    Neuropathy  Chronic and stable condition.    Patient notes that some days are better than others with his neuropathy.    He notes his feet are worse in his hands.      Prediabetes  Chronic stable condition.    Fasting blood glucose 140  States his diet has not been great  Notes stress with his wifes health   Plans to change and modify health after January     Benign prostatic hyperplasia with nocturia  Chronic and stable condition   Takes hytrin at night   Notes he does have to get up about 2-3 times a night     Dyslipidemia  Chronic and stable condition   Takes crestor 5 mg daily   LFTs are stable    Denies myalgias    Obesity (BMI 30.0-34.9)  Chronic and stable condition.  BMI 32.99, weight 217  This is an increase from his weight since May  He is working on planning to lose weight starting January due to how scarce that he has had in his wife's health.    Hyponatremia  Chronic and stable condition.  Recent labs indicate sodium to be 134  History of hyponatremia for the last 6 years      Decreased GFR  Chronic stable condition.  Improvement since last labs from 57-63     Services suggested: No services needed at this time  Health Care Screening recommendations as per orders if indicated.  Referrals offered: PT/OT/Nutrition counseling/Behavioral Health/Smoking cessation as per orders if indicated.    Discussion today about general wellness and lifestyle habits:    Prevent falls and reduce trip hazards; Cautioned about securing or removing rugs.  Have a working fire alarm and carbon monoxide detector;   Engage in regular physical activity and social activities     Follow-up: Return in about 1 year (around 12/21/2023) for after 12/22/2023 for AWV.

## 2022-12-21 NOTE — ASSESSMENT & PLAN NOTE
Chronic and stable condition   Takes hytrin at night   Notes he does have to get up about 2-3 times a night

## 2022-12-21 NOTE — ASSESSMENT & PLAN NOTE
Latest Reference Range & Units 12/09/21 09:40 12/16/22 08:42   Platelet Count 164 - 446 K/uL 141 (L) 125 (L)     Chronic and stable condition.  Labs since last year did show a decrease in his platelet count  Patient is previously seen hematology for this and was told there were no concerns  He does continue to be asymptomatic.

## 2022-12-21 NOTE — ASSESSMENT & PLAN NOTE
Chronic stable condition.    Continues to take ASA 81  Follows with Dr. Leavitt with [facility]

## 2022-12-21 NOTE — ASSESSMENT & PLAN NOTE
Chronic and stable condition.    Patient notes that some days are better than others with his neuropathy.    He notes his feet are worse in his hands.

## 2022-12-21 NOTE — ASSESSMENT & PLAN NOTE
Chronic and stable condition.  BMI 32.99, weight 217  This is an increase from his weight since May  He is working on planning to lose weight starting January due to how scarce that he has had in his wife's health.

## 2022-12-21 NOTE — ASSESSMENT & PLAN NOTE
Chronic and stable condition   Takes nifedipine 90 mg daily- which is an increase from 60mg to 90 mg, carvedilol 25mg BID, benazepril 40 mg daily   States since the change in medications he is feeling like his BP is lower than he would like

## 2022-12-21 NOTE — ASSESSMENT & PLAN NOTE
Chronic and stable condition.    Patient currently takes cialis 2.5mg daily   Notes he is able to sustain erections with notable issues

## 2022-12-21 NOTE — ASSESSMENT & PLAN NOTE
Chronic and stable condition.  Recent labs indicate sodium to be 134  History of hyponatremia for the last 6 years

## 2022-12-21 NOTE — ASSESSMENT & PLAN NOTE
Chronic stable condition.    Fasting blood glucose 140  States his diet has not been great  Notes stress with his wifes health   Plans to change and modify health after January

## 2022-12-21 NOTE — ASSESSMENT & PLAN NOTE
Chronic and stable condition.    Patient continues to take over-the-counter vitamin D supplementation

## 2023-01-18 ENCOUNTER — HOSPITAL ENCOUNTER (OUTPATIENT)
Dept: LAB | Facility: MEDICAL CENTER | Age: 68
End: 2023-01-18
Attending: NURSE PRACTITIONER
Payer: MEDICARE

## 2023-01-18 DIAGNOSIS — R73.03 PREDIABETES: ICD-10-CM

## 2023-01-18 DIAGNOSIS — D69.6 THROMBOCYTOPENIA, UNSPECIFIED (HCC): ICD-10-CM

## 2023-01-18 LAB
ERYTHROCYTE [DISTWIDTH] IN BLOOD BY AUTOMATED COUNT: 40.6 FL (ref 35.9–50)
EST. AVERAGE GLUCOSE BLD GHB EST-MCNC: 114 MG/DL
HBA1C MFR BLD: 5.6 % (ref 4–5.6)
HCT VFR BLD AUTO: 41.8 % (ref 42–52)
HGB BLD-MCNC: 14.7 G/DL (ref 14–18)
MCH RBC QN AUTO: 31.1 PG (ref 27–33)
MCHC RBC AUTO-ENTMCNC: 35.2 G/DL (ref 33.7–35.3)
MCV RBC AUTO: 88.6 FL (ref 81.4–97.8)
PLATELET # BLD AUTO: 139 K/UL (ref 164–446)
PMV BLD AUTO: 9.9 FL (ref 9–12.9)
RBC # BLD AUTO: 4.72 M/UL (ref 4.7–6.1)
WBC # BLD AUTO: 7.1 K/UL (ref 4.8–10.8)

## 2023-01-18 PROCEDURE — 83036 HEMOGLOBIN GLYCOSYLATED A1C: CPT

## 2023-01-18 PROCEDURE — 85027 COMPLETE CBC AUTOMATED: CPT

## 2023-01-18 PROCEDURE — 36415 COLL VENOUS BLD VENIPUNCTURE: CPT

## 2023-01-31 ENCOUNTER — OFFICE VISIT (OUTPATIENT)
Dept: MEDICAL GROUP | Facility: PHYSICIAN GROUP | Age: 68
End: 2023-01-31
Payer: MEDICARE

## 2023-01-31 VITALS
SYSTOLIC BLOOD PRESSURE: 136 MMHG | HEART RATE: 68 BPM | OXYGEN SATURATION: 98 % | RESPIRATION RATE: 16 BRPM | BODY MASS INDEX: 32.86 KG/M2 | HEIGHT: 68 IN | DIASTOLIC BLOOD PRESSURE: 68 MMHG | WEIGHT: 216.8 LBS | TEMPERATURE: 97.8 F

## 2023-01-31 DIAGNOSIS — R94.4 DECREASED GFR: ICD-10-CM

## 2023-01-31 DIAGNOSIS — R73.03 PREDIABETES: ICD-10-CM

## 2023-01-31 DIAGNOSIS — D69.6 THROMBOCYTOPENIA, UNSPECIFIED (HCC): ICD-10-CM

## 2023-01-31 DIAGNOSIS — E78.5 DYSLIPIDEMIA: ICD-10-CM

## 2023-01-31 DIAGNOSIS — M70.22 OLECRANON BURSITIS OF LEFT ELBOW: ICD-10-CM

## 2023-01-31 DIAGNOSIS — M71.122 SEPTIC BURSITIS OF ELBOW, LEFT: ICD-10-CM

## 2023-01-31 PROCEDURE — 99214 OFFICE O/P EST MOD 30 MIN: CPT | Performed by: NURSE PRACTITIONER

## 2023-01-31 RX ORDER — IBUPROFEN 800 MG/1
800 TABLET ORAL EVERY 8 HOURS PRN
Qty: 60 TABLET | Refills: 0 | Status: SHIPPED | OUTPATIENT
Start: 2023-01-31 | End: 2023-01-31

## 2023-01-31 RX ORDER — CEPHALEXIN 500 MG/1
500 CAPSULE ORAL 4 TIMES DAILY
Qty: 20 CAPSULE | Refills: 0 | Status: SHIPPED | OUTPATIENT
Start: 2023-01-31 | End: 2023-05-03

## 2023-01-31 RX ORDER — CELECOXIB 200 MG/1
CAPSULE ORAL
Qty: 15 CAPSULE | Refills: 0 | Status: SHIPPED | OUTPATIENT
Start: 2023-01-31 | End: 2023-02-21

## 2023-01-31 ASSESSMENT — PATIENT HEALTH QUESTIONNAIRE - PHQ9: CLINICAL INTERPRETATION OF PHQ2 SCORE: 0

## 2023-01-31 ASSESSMENT — FIBROSIS 4 INDEX: FIB4 SCORE: 2.2

## 2023-01-31 NOTE — ASSESSMENT & PLAN NOTE
Acute and uncomplicated condition   Onset Sunday after hitting elbow, notes that Monday it was swollen and erythematous.   History of last bursitis 12/2021  Denies chills, fever, drainage

## 2023-01-31 NOTE — PROGRESS NOTES
CC:  Chief Complaint   Patient presents with    Elbow Pain     X2days lump left elbow, swollen        HISTORY OF PRESENT ILLNESS: Patient is a 67 y.o. male established patient presenting     Olecranon bursitis of left elbow  Acute and uncomplicated condition   Onset  after hitting elbow, notes that Monday it was swollen and erythematous.   History of last bursitis 2021  Denies chills, fever, drainage         Allergies:Patient has no known allergies.    Current Outpatient Medications   Medication Sig Dispense Refill    cephALEXin (KEFLEX) 500 MG Cap Take 1 Capsule by mouth 4 times a day. 20 Capsule 0    celecoxib (CELEBREX) 200 MG Cap First dose take 400 mg, 2 tabs. Then 200 mg BID for 7 days 15 Capsule 0    NIFEdipine SR (PROCADIA-XL) 90 MG CR tablet Take 1 Tablet by mouth every day. 90 Tablet 4    carvedilol (COREG) 25 MG Tab Take 1 Tablet by mouth 2 times a day with meals. 180 Tablet 4    benazepril (LOTENSIN) 40 MG tablet Take 1 Tablet by mouth every day. 90 Tablet 4    rosuvastatin (CRESTOR) 5 MG Tab Take 1 Tablet by mouth every day. 100 Tablet 4    Pyridoxine HCl (VITAMIN B-6 PO) Take 1 Tablet by mouth every day. Unable to recall dose      Cyanocobalamin (VITAMIN B-12 PO) Take 1 Tablet by mouth. 500 mcg      aspirin EC (ECOTRIN) 81 MG Tablet Delayed Response Take 1 Tablet by mouth every day.      terazosin (HYTRIN) 10 MG capsule Take 1 Capsule by mouth every day. 90 Capsule 2    vitamin D (VITAMIND D3) 1000 UNIT Tab Take 2 Tablets by mouth every day.      tadalafil (CIALIS) 5 MG tablet Take 0.5 Tablets by mouth.       No current facility-administered medications for this visit.     History reviewed. No pertinent past medical history.  History reviewed. No pertinent surgical history.  Social History     Tobacco Use    Smoking status: Former     Packs/day: 0.25     Years: 2.00     Pack years: 0.50     Types: Cigarettes     Quit date:      Years since quittin.1    Smokeless tobacco: Never  "  Vaping Use    Vaping Use: Never used   Substance Use Topics    Alcohol use: Yes     Alcohol/week: 2.4 - 3.0 oz     Types: 4 - 5 Cans of beer per week     Comment: occ    Drug use: Never     Social History     Social History Narrative    Not on file       Family History   Problem Relation Age of Onset    Stroke Mother          of CVA at 74    Heart Disease Mother         CABG/Valve at 70    Other Father          at 28 MVA    Lung Disease Sister         COPD    Diabetes Sister     Heart Disease Brother         3V CAD, going for valve and CABG        ROS    see HPI      - NOTE: All other systems reviewed and are negative, except as in HPI.      Exam:    /68 (BP Location: Left arm, Patient Position: Sitting, BP Cuff Size: Adult)   Pulse 68   Temp 36.6 °C (97.8 °F) (Temporal)   Resp 16   Ht 1.727 m (5' 8\")   Wt 98.3 kg (216 lb 12.8 oz)   SpO2 98%  Body mass index is 32.96 kg/m².    General: Alert, pleasant, NAD  EYES:   PERRL, EOMI, no icterus or pallor.  Conjunctivae and lids normal.   HENT:  Normocephalic.  External ears normal.   Skin: Warm, dry, no rashes. Left elbow with erythematous, cellulitis  Musculoskeletal: Gait is normal.  Moves all extremities well.    Extremities: No clubbing, cyanosis or edema noted.   Neurological: No tremors, sensation grossly intact, tone/strength normal, gait is normal.  Psych:  Affect/mood is normal, judgement is good, memory is intact, grooming is appropriate.      Assessment/Plan:    1. Thrombocytopenia, unspecified (HCC)  - CBC WITHOUT DIFFERENTIAL; Future    2. Dyslipidemia    3. Prediabetes  - HEMOGLOBIN A1C; Future  - Comp Metabolic Panel; Future    4. Decreased GFR  - Comp Metabolic Panel; Future    5. Olecranon bursitis of left elbow  - cephALEXin (KEFLEX) 500 MG Cap; Take 1 Capsule by mouth 4 times a day.  Dispense: 20 Capsule; Refill: 0  - celecoxib (CELEBREX) 200 MG Cap; First dose take 400 mg, 2 tabs. Then 200 mg BID for 7 days  Dispense: 15 Capsule; " Refill: 0    6. Septic bursitis of elbow, left  - cephALEXin (KEFLEX) 500 MG Cap; Take 1 Capsule by mouth 4 times a day.  Dispense: 20 Capsule; Refill: 0  - celecoxib (CELEBREX) 200 MG Cap; First dose take 400 mg, 2 tabs. Then 200 mg BID for 7 days  Dispense: 15 Capsule; Refill: 0        Discussed with patient possible alternative diagnoses, patient is to take all medications as prescribed.     If symptoms persist FU w/PCP, if symptoms worsen go to emergency room.     If experiencing any side effects from prescribed medications reports to the office immediately or go to emergency room.    Reviewed indication, dosage, usage and potential adverse effects of prescribed medications.     Reviewed risks and benefits of treatment plan. Patient verbalizes understanding of all instruction and verbally agrees to plan.      Return in about 6 months (around 7/31/2023) for for worsening symptoms, follow up labs in 6 months.      Please note that this dictation was created using voice recognition software. I have made every reasonable attempt to correct obvious errors, but I expect that there are errors of grammar and possibly content that I did not discover before finalizing the note.

## 2023-02-21 ENCOUNTER — OFFICE VISIT (OUTPATIENT)
Dept: MEDICAL GROUP | Facility: PHYSICIAN GROUP | Age: 68
End: 2023-02-21
Payer: MEDICARE

## 2023-02-21 VITALS
TEMPERATURE: 97.8 F | HEART RATE: 67 BPM | BODY MASS INDEX: 32.16 KG/M2 | DIASTOLIC BLOOD PRESSURE: 58 MMHG | RESPIRATION RATE: 14 BRPM | HEIGHT: 68 IN | SYSTOLIC BLOOD PRESSURE: 120 MMHG | OXYGEN SATURATION: 97 % | WEIGHT: 212.2 LBS

## 2023-02-21 DIAGNOSIS — M70.21 OLECRANON BURSITIS OF RIGHT ELBOW: ICD-10-CM

## 2023-02-21 PROCEDURE — 99214 OFFICE O/P EST MOD 30 MIN: CPT | Performed by: NURSE PRACTITIONER

## 2023-02-21 RX ORDER — CELECOXIB 200 MG/1
200 CAPSULE ORAL 2 TIMES DAILY
Qty: 60 CAPSULE | Refills: 0 | Status: SHIPPED | OUTPATIENT
Start: 2023-02-21 | End: 2023-05-03

## 2023-02-21 ASSESSMENT — FIBROSIS 4 INDEX: FIB4 SCORE: 2.2

## 2023-02-22 NOTE — ASSESSMENT & PLAN NOTE
Chronic and exacerbated condition   States swelling and pain to right elbow since Saturday   Denies trauma to area  Notes history of bursitis in past  Uses padding to his elbows occasionally but not all the time  Has been using advil for pain  No chills, fever, drainage

## 2023-02-22 NOTE — PROGRESS NOTES
CC:  Chief Complaint   Patient presents with    Elbow Pain     Right       HISTORY OF PRESENT ILLNESS: Patient is a 67 y.o. male established patient presenting     Olecranon bursitis of right elbow  Chronic and exacerbated condition   States swelling and pain to right elbow since Saturday   Denies trauma to area  Notes history of bursitis in past  Uses padding to his elbows occasionally but not all the time  Has been using advil for pain  No chills, fever, drainage       Allergies:Patient has no known allergies.    Current Outpatient Medications   Medication Sig Dispense Refill    celecoxib (CELEBREX) 200 MG Cap Take 1 Capsule by mouth 2 times a day. 60 Capsule 0    cephALEXin (KEFLEX) 500 MG Cap Take 1 Capsule by mouth 4 times a day. 20 Capsule 0    NIFEdipine SR (PROCADIA-XL) 90 MG CR tablet Take 1 Tablet by mouth every day. 90 Tablet 4    carvedilol (COREG) 25 MG Tab Take 1 Tablet by mouth 2 times a day with meals. 180 Tablet 4    benazepril (LOTENSIN) 40 MG tablet Take 1 Tablet by mouth every day. 90 Tablet 4    rosuvastatin (CRESTOR) 5 MG Tab Take 1 Tablet by mouth every day. 100 Tablet 4    Pyridoxine HCl (VITAMIN B-6 PO) Take 1 Tablet by mouth every day. Unable to recall dose      Cyanocobalamin (VITAMIN B-12 PO) Take 1 Tablet by mouth. 500 mcg      aspirin EC (ECOTRIN) 81 MG Tablet Delayed Response Take 1 Tablet by mouth every day.      terazosin (HYTRIN) 10 MG capsule Take 1 Capsule by mouth every day. 90 Capsule 2    vitamin D (VITAMIND D3) 1000 UNIT Tab Take 2 Tablets by mouth every day.      tadalafil (CIALIS) 5 MG tablet Take 0.5 Tablets by mouth.       No current facility-administered medications for this visit.     History reviewed. No pertinent past medical history.  History reviewed. No pertinent surgical history.  Social History     Tobacco Use    Smoking status: Former     Packs/day: 0.25     Years: 2.00     Pack years: 0.50     Types: Cigarettes     Quit date:      Years since quittin.1  "   Smokeless tobacco: Never   Vaping Use    Vaping Use: Never used   Substance Use Topics    Alcohol use: Yes     Alcohol/week: 2.4 - 3.0 oz     Types: 4 - 5 Cans of beer per week     Comment: occ    Drug use: Never     Social History     Social History Narrative    Not on file       Family History   Problem Relation Age of Onset    Stroke Mother          of CVA at 74    Heart Disease Mother         CABG/Valve at 70    Other Father          at 28 MVA    Lung Disease Sister         COPD    Diabetes Sister     Heart Disease Brother         3V CAD, going for valve and CABG        ROS    see hPI      - NOTE: All other systems reviewed and are negative, except as in HPI.      Exam:    /58 (BP Location: Right arm, Patient Position: Sitting, BP Cuff Size: Adult)   Pulse 67   Temp 36.6 °C (97.8 °F) (Temporal)   Resp 14   Ht 1.727 m (5' 8\")   Wt 96.3 kg (212 lb 3.2 oz)   SpO2 97%  Body mass index is 32.26 kg/m².    General: Alert, pleasant, NAD  EYES:   PERRL, EOMI, no icterus or pallor.  Conjunctivae and lids normal.   HENT:  Normocephalic.    Skin: Warm, dry, no rashes.  Musculoskeletal: Gait is normal.  Moves all extremities well.    Extremities: No clubbing, cyanosis or edema noted. Swelling, erythema, noted to right elbow, no fluctuance to elbow, 3cm in diameter  Neurological: No tremors, sensation grossly intact, tone/strength normal, gait is normal.  Psych:  Affect/mood is normal, judgement is good, memory is intact, grooming is appropriate.    Assessment/Plan:    1. Olecranon bursitis of right elbow  - celecoxib (CELEBREX) 200 MG Cap; Take 1 Capsule by mouth 2 times a day.  Dispense: 60 Capsule; Refill: 0       Discussed with patient possible alternative diagnoses, patient is to take all medications as prescribed.     If symptoms persist FU w/PCP, if symptoms worsen go to emergency room.     If experiencing any side effects from prescribed medications reports to the office immediately or go to " emergency room.    Reviewed indication, dosage, usage and potential adverse effects of prescribed medications.     Reviewed risks and benefits of treatment plan. Patient verbalizes understanding of all instruction and verbally agrees to plan.      Return for prn or worsening symptoms.      Please note that this dictation was created using voice recognition software. I have made every reasonable attempt to correct obvious errors, but I expect that there are errors of grammar and possibly content that I did not discover before finalizing the note.

## 2023-03-10 ENCOUNTER — HOSPITAL ENCOUNTER (OUTPATIENT)
Dept: LAB | Facility: MEDICAL CENTER | Age: 68
End: 2023-03-10
Attending: PHYSICIAN ASSISTANT
Payer: MEDICARE

## 2023-03-10 LAB — PSA SERPL-MCNC: 0.49 NG/ML (ref 0–4)

## 2023-03-10 PROCEDURE — 84153 ASSAY OF PSA TOTAL: CPT

## 2023-03-10 PROCEDURE — 36415 COLL VENOUS BLD VENIPUNCTURE: CPT

## 2023-03-13 DIAGNOSIS — I10 ESSENTIAL HYPERTENSION: ICD-10-CM

## 2023-03-13 RX ORDER — BENAZEPRIL HYDROCHLORIDE 40 MG/1
40 TABLET ORAL DAILY
Qty: 100 TABLET | Refills: 3 | OUTPATIENT
Start: 2023-03-13

## 2023-03-13 RX ORDER — BENAZEPRIL HYDROCHLORIDE 40 MG/1
40 TABLET ORAL DAILY
Qty: 100 TABLET | Refills: 2 | Status: SHIPPED | OUTPATIENT
Start: 2023-03-13 | End: 2023-05-18 | Stop reason: SDUPTHER

## 2023-03-13 NOTE — TELEPHONE ENCOUNTER
Is the patient due for a refill? Yes, insurance require 100 days supply     Was the patient seen the past year? Yes    Date of last office visit: 11/16/22    Does the patient have an upcoming appointment?  Yes   If yes, When? 11/29/23    Provider to refill:TT     Does the patients insurance require a 100 day supply?  Yes

## 2023-05-03 ENCOUNTER — OFFICE VISIT (OUTPATIENT)
Dept: MEDICAL GROUP | Facility: PHYSICIAN GROUP | Age: 68
End: 2023-05-03
Payer: MEDICARE

## 2023-05-03 VITALS
TEMPERATURE: 97.5 F | WEIGHT: 204.4 LBS | SYSTOLIC BLOOD PRESSURE: 144 MMHG | HEART RATE: 59 BPM | BODY MASS INDEX: 30.98 KG/M2 | DIASTOLIC BLOOD PRESSURE: 60 MMHG | HEIGHT: 68 IN | OXYGEN SATURATION: 98 % | RESPIRATION RATE: 12 BRPM

## 2023-05-03 DIAGNOSIS — M25.472 BILATERAL SWELLING OF FEET AND ANKLES: ICD-10-CM

## 2023-05-03 DIAGNOSIS — M25.475 BILATERAL SWELLING OF FEET AND ANKLES: ICD-10-CM

## 2023-05-03 DIAGNOSIS — M25.471 BILATERAL SWELLING OF FEET AND ANKLES: ICD-10-CM

## 2023-05-03 DIAGNOSIS — M25.474 BILATERAL SWELLING OF FEET AND ANKLES: ICD-10-CM

## 2023-05-03 PROCEDURE — 99213 OFFICE O/P EST LOW 20 MIN: CPT | Performed by: NURSE PRACTITIONER

## 2023-05-03 ASSESSMENT — FIBROSIS 4 INDEX: FIB4 SCORE: 2.2

## 2023-05-03 ASSESSMENT — ENCOUNTER SYMPTOMS
FEVER: 0
SHORTNESS OF BREATH: 0
CHILLS: 0

## 2023-05-03 NOTE — PROGRESS NOTES
"CC:  Chief Complaint   Patient presents with    Ankle Swelling     both       HISTORY OF PRESENT ILLNESS: Patient is a 67 y.o. male established patient presenting     Problem   Bilateral Swelling of Feet and Ankles    Notes on and off for the last few months   Notes he stays away from salt and preserved foods  Notes on the weekends   Patient denies shortness of breath, pitting edema             Review of Systems   Constitutional:  Negative for chills and fever.   Respiratory:  Negative for shortness of breath.    Cardiovascular:  Negative for chest pain.           - NOTE: All other systems reviewed and are negative, except as in HPI.      Exam:    BP (!) 144/60 (BP Location: Right arm, Patient Position: Sitting, BP Cuff Size: Adult)   Pulse (!) 59   Temp 36.4 °C (97.5 °F) (Temporal)   Resp 12   Ht 1.727 m (5' 8\")   Wt 92.7 kg (204 lb 6.4 oz)   SpO2 98%  Body mass index is 31.08 kg/m².    Physical Exam  Constitutional:       Appearance: Normal appearance. He is normal weight.   HENT:      Head: Normocephalic and atraumatic.      Left Ear: Ear canal normal.   Cardiovascular:      Rate and Rhythm: Normal rate and regular rhythm.      Pulses: Normal pulses.      Heart sounds: Normal heart sounds.   Pulmonary:      Effort: Pulmonary effort is normal.   Musculoskeletal:         General: No swelling. Normal range of motion.      Cervical back: Normal range of motion and neck supple.      Right lower leg: No edema.      Left lower leg: No edema.   Skin:     General: Skin is warm and dry.   Neurological:      General: No focal deficit present.      Mental Status: He is alert and oriented to person, place, and time. Mental status is at baseline.   Psychiatric:         Mood and Affect: Mood normal.         Behavior: Behavior normal.         Thought Content: Thought content normal.         Judgment: Judgment normal.         Assessment/Plan:    1. Bilateral swelling of feet and ankles  Acute uncomplicated " condition.  Discussed with patient that he may benefit from having compression stockings.  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  He notes that when he does sit down at home and puts his legs up the edema does improve.  He will continue to monitor this and let us know if there is worsening symptoms otherwise at this time to continue with diet, compression stockings       Discussed with patient possible alternative diagnoses, patient is to take all medications as prescribed.     If symptoms persist FU w/PCP, if symptoms worsen go to emergency room.     If experiencing any side effects from prescribed medications reports to the office immediately or go to emergency room.    Reviewed indication, dosage, usage and potential adverse effects of prescribed medications.     Reviewed risks and benefits of treatment plan. Patient verbalizes understanding of all instruction and verbally agrees to plan.      Return for Follow-up pending edema.      Please note that this dictation was created using voice recognition software. I have made every reasonable attempt to correct obvious errors, but I expect that there are errors of grammar and possibly content that I did not discover before finalizing the note.

## 2023-05-17 NOTE — PROGRESS NOTES
Chief Complaint   Patient presents with    Hypertension     Follow up           Subjective:   Antolin Omer is a 67 y.o. male who presents today for follow-up.    Patient of Dr. Leavitt.  Current medical problems include hypertension, hyperlipidemia, CAC (>800) without obstructive disease on angiogram in 2016.    Feels like he is overmedicated sometimes. Feels a little woozy after taking his medications once in while, gets better after a few hours. Doesn't check his blood pressures at home but is very interested in reducing the amount of blood pressure medication he has to take.     Additionally he complains of leg swelling in both legs. Started noticing in in the last 3-4 months. Most noticeable at the end of the day and this improves when he raises his legs.No associated HERNANDEZ, orthopnea. He did have some type of outside testing done regarding the blood flow in his legs but he wasn't able to find a copy of this to show me.     He drinks 1-3 beers every other day.      2016 Angiogram at [facility] in [place]  CAC score >800        History reviewed. No pertinent past medical history.      History reviewed. No pertinent surgical history.      Family History   Problem Relation Age of Onset    Stroke Mother          of CVA at 74    Heart Disease Mother         CABG/Valve at 70    Other Father          at 28 MVA    Lung Disease Sister         COPD    Diabetes Sister     Heart Disease Brother         3V CAD, going for valve and CABG         Social History     Tobacco Use   Smoking Status Former    Packs/day: 0.25    Years: 2.00    Pack years: 0.50    Types: Cigarettes    Quit date:     Years since quittin.4   Smokeless Tobacco Never   Vaping Use    Vaping Use: Never used          Social History     Substance and Sexual Activity   Alcohol Use Yes    Alcohol/week: 2.4 - 3.0 oz    Types: 4 - 5 Cans of beer per week    Comment: occ         No Known Allergies      Outpatient Encounter Medications as of  "5/18/2023   Medication Sig Dispense Refill    carvedilol (COREG) 25 MG Tab Take 1 Tablet by mouth 2 times a day with meals. 180 Tablet 4    benazepril (LOTENSIN) 40 MG tablet Take 1 Tablet by mouth every day. 100 Tablet 2    NIFEdipine SR (PROCADIA-XL) 90 MG CR tablet Take 1 Tablet by mouth every day. 90 Tablet 4    rosuvastatin (CRESTOR) 5 MG Tab Take 1 Tablet by mouth every day. 100 Tablet 4    Pyridoxine HCl (VITAMIN B-6 PO) Take 1 Tablet by mouth every day. Unable to recall dose      Cyanocobalamin (VITAMIN B-12 PO) Take 1 Tablet by mouth. 500 mcg      aspirin EC (ECOTRIN) 81 MG Tablet Delayed Response Take 1 Tablet by mouth every day.      terazosin (HYTRIN) 10 MG capsule Take 1 Capsule by mouth every day. 90 Capsule 2    vitamin D (VITAMIND D3) 1000 UNIT Tab Take 2 Tablets by mouth every day.      tadalafil (CIALIS) 5 MG tablet Take 0.5 Tablets by mouth.      [DISCONTINUED] benazepril (LOTENSIN) 40 MG tablet Take 1 Tablet by mouth every day. 100 Tablet 2    [DISCONTINUED] NIFEdipine SR (PROCADIA-XL) 90 MG CR tablet Take 1 Tablet by mouth every day. 90 Tablet 4    [DISCONTINUED] carvedilol (COREG) 25 MG Tab Take 1 Tablet by mouth 2 times a day with meals. 180 Tablet 4    [DISCONTINUED] rosuvastatin (CRESTOR) 5 MG Tab Take 1 Tablet by mouth every day. 100 Tablet 4     No facility-administered encounter medications on file as of 5/18/2023.         Review of Systems   Constitutional:         No unexpected weight changes   Respiratory:  Negative for cough and shortness of breath.    Cardiovascular:  Positive for leg swelling. Negative for chest pain, palpitations, orthopnea and PND.   Gastrointestinal:  Negative for blood in stool, melena and nausea.   Neurological:  Positive for dizziness.   Psychiatric/Behavioral:  Negative for substance abuse.           Objective:   /54 (BP Location: Left arm, Patient Position: Sitting)   Pulse (!) 52   Resp 16   Ht 1.727 m (5' 8\")   Wt 93 kg (205 lb)   SpO2 97%   " BMI 31.17 kg/m²        Physical Exam  Vitals reviewed.   Constitutional:       General: He is not in acute distress.  HENT:      Head: Normocephalic and atraumatic.   Eyes:      General: No scleral icterus.  Cardiovascular:      Rate and Rhythm: Normal rate and regular rhythm.      Pulses: Normal pulses.      Heart sounds: No murmur heard.     Comments: Radial pulses 2+ bilaterally  PT pulses 2+ bilaterally    Pulmonary:      Effort: Pulmonary effort is normal. No respiratory distress.      Breath sounds: No rales.   Abdominal:      General: There is no distension.   Musculoskeletal:      Right lower leg: No edema.      Left lower leg: No edema.   Skin:     General: Skin is warm and dry.      Comments: No signs of cyanosis, open wounds on legs   Neurological:      General: No focal deficit present.      Mental Status: He is alert.      Gait: Gait normal.   Psychiatric:         Judgment: Judgment normal.         TTE 9/23/21  CONCLUSIONS  Fair quality study.  The left ventricular ejection fraction is visually estimated to be 65-70%.  The aortic valve is not well visualized, probably trileaflet with trivial sclerosis and normal function.  Right ventricular systolic pressure is estimated to be 33 mmHg.     Assessment:     1. Essential hypertension  carvedilol (COREG) 25 MG Tab    benazepril (LOTENSIN) 40 MG tablet    NIFEdipine SR (PROCADIA-XL) 90 MG CR tablet      2. Dyslipidemia  rosuvastatin (CRESTOR) 5 MG Tab    Lipid Profile      3. Bilateral swelling of feet and ankles        4. Coronary artery calcification seen on CT scan        5. Disorder of arteries and arterioles, unspecified (HCC)             Medical Decision Making:  Today's Assessment / Plan:   Leg swelling  - likely venous insufficiency. No respiratory symptoms. No signs of PAD on exam but I will review the testing he had if he can find them.   - elevate legs, use compression stockings.     Hypertension  - seems to be controlled in office. BP log  provided to check at home  - check labs annually  - cont nifedipine, coreg, benazepril. Refills provided. If BPs over controlled at home would decrease coreg as this is probably causing his dizziness  - recommend reducing ETOH to 0-1 drink per day    CAC on CT  Coronary arteriosclerosis   - reviewed outside records  - cont aspirin and statin, LDL well controlled. Recheck lipid profile annually. LDL goal <70    RTC in 1 year, sooner if needed

## 2023-05-18 ENCOUNTER — OFFICE VISIT (OUTPATIENT)
Dept: CARDIOLOGY | Facility: MEDICAL CENTER | Age: 68
End: 2023-05-18
Attending: PHYSICIAN ASSISTANT
Payer: MEDICARE

## 2023-05-18 VITALS
BODY MASS INDEX: 31.07 KG/M2 | SYSTOLIC BLOOD PRESSURE: 132 MMHG | HEART RATE: 52 BPM | RESPIRATION RATE: 16 BRPM | OXYGEN SATURATION: 97 % | HEIGHT: 68 IN | DIASTOLIC BLOOD PRESSURE: 54 MMHG | WEIGHT: 205 LBS

## 2023-05-18 DIAGNOSIS — I77.9 DISORDER OF ARTERIES AND ARTERIOLES, UNSPECIFIED (HCC): ICD-10-CM

## 2023-05-18 DIAGNOSIS — E78.5 DYSLIPIDEMIA: ICD-10-CM

## 2023-05-18 DIAGNOSIS — M25.471 BILATERAL SWELLING OF FEET AND ANKLES: ICD-10-CM

## 2023-05-18 DIAGNOSIS — I25.10 CORONARY ARTERY CALCIFICATION SEEN ON CT SCAN: ICD-10-CM

## 2023-05-18 DIAGNOSIS — I10 ESSENTIAL HYPERTENSION: ICD-10-CM

## 2023-05-18 DIAGNOSIS — M25.472 BILATERAL SWELLING OF FEET AND ANKLES: ICD-10-CM

## 2023-05-18 DIAGNOSIS — M25.475 BILATERAL SWELLING OF FEET AND ANKLES: ICD-10-CM

## 2023-05-18 DIAGNOSIS — M25.474 BILATERAL SWELLING OF FEET AND ANKLES: ICD-10-CM

## 2023-05-18 PROCEDURE — 3078F DIAST BP <80 MM HG: CPT | Performed by: PHYSICIAN ASSISTANT

## 2023-05-18 PROCEDURE — 99214 OFFICE O/P EST MOD 30 MIN: CPT | Performed by: PHYSICIAN ASSISTANT

## 2023-05-18 PROCEDURE — 99212 OFFICE O/P EST SF 10 MIN: CPT | Performed by: PHYSICIAN ASSISTANT

## 2023-05-18 PROCEDURE — 3075F SYST BP GE 130 - 139MM HG: CPT | Performed by: PHYSICIAN ASSISTANT

## 2023-05-18 RX ORDER — NIFEDIPINE 90 MG/1
90 TABLET, EXTENDED RELEASE ORAL DAILY
Qty: 90 TABLET | Refills: 4 | Status: SHIPPED | OUTPATIENT
Start: 2023-05-18

## 2023-05-18 RX ORDER — CARVEDILOL 25 MG/1
25 TABLET ORAL 2 TIMES DAILY WITH MEALS
Qty: 180 TABLET | Refills: 4 | Status: SHIPPED | OUTPATIENT
Start: 2023-05-18

## 2023-05-18 RX ORDER — ROSUVASTATIN CALCIUM 5 MG/1
5 TABLET, COATED ORAL DAILY
Qty: 100 TABLET | Refills: 4 | Status: SHIPPED | OUTPATIENT
Start: 2023-05-18

## 2023-05-18 RX ORDER — BENAZEPRIL HYDROCHLORIDE 40 MG/1
40 TABLET ORAL DAILY
Qty: 100 TABLET | Refills: 2 | Status: SHIPPED | OUTPATIENT
Start: 2023-05-18 | End: 2023-11-16 | Stop reason: SDUPTHER

## 2023-05-18 ASSESSMENT — ENCOUNTER SYMPTOMS
PND: 0
COUGH: 0
ORTHOPNEA: 0
BLOOD IN STOOL: 0
PALPITATIONS: 0
NAUSEA: 0
DIZZINESS: 1
SHORTNESS OF BREATH: 0

## 2023-05-18 ASSESSMENT — LIFESTYLE VARIABLES: SUBSTANCE_ABUSE: 0

## 2023-05-18 ASSESSMENT — FIBROSIS 4 INDEX: FIB4 SCORE: 2.2

## 2023-06-22 ENCOUNTER — PATIENT MESSAGE (OUTPATIENT)
Dept: HEALTH INFORMATION MANAGEMENT | Facility: OTHER | Age: 68
End: 2023-06-22

## 2023-06-22 ENCOUNTER — DOCUMENTATION (OUTPATIENT)
Dept: HEALTH INFORMATION MANAGEMENT | Facility: OTHER | Age: 68
End: 2023-06-22
Payer: MEDICARE

## 2023-06-22 NOTE — PROGRESS NOTES
1st & 2nd attempt were done by [facility] [place] on 2/2/2023 & 6/9/2023.    Colorectal Screening: Next 2026

## 2023-07-14 ENCOUNTER — TELEPHONE (OUTPATIENT)
Dept: HEALTH INFORMATION MANAGEMENT | Facility: OTHER | Age: 68
End: 2023-07-14
Payer: MEDICARE

## 2023-07-17 ENCOUNTER — HOSPITAL ENCOUNTER (OUTPATIENT)
Dept: LAB | Facility: MEDICAL CENTER | Age: 68
End: 2023-07-17
Attending: NURSE PRACTITIONER
Payer: MEDICARE

## 2023-07-17 DIAGNOSIS — E78.5 DYSLIPIDEMIA: ICD-10-CM

## 2023-07-17 DIAGNOSIS — R73.03 PREDIABETES: ICD-10-CM

## 2023-07-17 DIAGNOSIS — R94.4 DECREASED GFR: ICD-10-CM

## 2023-07-17 DIAGNOSIS — D69.6 THROMBOCYTOPENIA, UNSPECIFIED (HCC): ICD-10-CM

## 2023-07-17 LAB
ALBUMIN SERPL BCP-MCNC: 4.3 G/DL (ref 3.2–4.9)
ALBUMIN/GLOB SERPL: 1.7 G/DL
ALP SERPL-CCNC: 60 U/L (ref 30–99)
ALT SERPL-CCNC: 15 U/L (ref 2–50)
ANION GAP SERPL CALC-SCNC: 13 MMOL/L (ref 7–16)
AST SERPL-CCNC: 12 U/L (ref 12–45)
BILIRUB SERPL-MCNC: 0.8 MG/DL (ref 0.1–1.5)
BUN SERPL-MCNC: 33 MG/DL (ref 8–22)
CALCIUM ALBUM COR SERPL-MCNC: 9.2 MG/DL (ref 8.5–10.5)
CALCIUM SERPL-MCNC: 9.4 MG/DL (ref 8.5–10.5)
CHLORIDE SERPL-SCNC: 96 MMOL/L (ref 96–112)
CHOLEST SERPL-MCNC: 111 MG/DL (ref 100–199)
CO2 SERPL-SCNC: 19 MMOL/L (ref 20–33)
CREAT SERPL-MCNC: 1.66 MG/DL (ref 0.5–1.4)
ERYTHROCYTE [DISTWIDTH] IN BLOOD BY AUTOMATED COUNT: 40.3 FL (ref 35.9–50)
EST. AVERAGE GLUCOSE BLD GHB EST-MCNC: 117 MG/DL
FASTING STATUS PATIENT QL REPORTED: NORMAL
GFR SERPLBLD CREATININE-BSD FMLA CKD-EPI: 44 ML/MIN/1.73 M 2
GLOBULIN SER CALC-MCNC: 2.6 G/DL (ref 1.9–3.5)
GLUCOSE SERPL-MCNC: 127 MG/DL (ref 65–99)
HBA1C MFR BLD: 5.7 % (ref 4–5.6)
HCT VFR BLD AUTO: 39.9 % (ref 42–52)
HDLC SERPL-MCNC: 55 MG/DL
HGB BLD-MCNC: 13.9 G/DL (ref 14–18)
LDLC SERPL CALC-MCNC: 42 MG/DL
MCH RBC QN AUTO: 30.3 PG (ref 27–33)
MCHC RBC AUTO-ENTMCNC: 34.8 G/DL (ref 32.3–36.5)
MCV RBC AUTO: 86.9 FL (ref 81.4–97.8)
PLATELET # BLD AUTO: 124 K/UL (ref 164–446)
PMV BLD AUTO: 10.5 FL (ref 9–12.9)
POTASSIUM SERPL-SCNC: 5.1 MMOL/L (ref 3.6–5.5)
PROT SERPL-MCNC: 6.9 G/DL (ref 6–8.2)
RBC # BLD AUTO: 4.59 M/UL (ref 4.7–6.1)
SODIUM SERPL-SCNC: 128 MMOL/L (ref 135–145)
TRIGL SERPL-MCNC: 72 MG/DL (ref 0–149)
WBC # BLD AUTO: 7.4 K/UL (ref 4.8–10.8)

## 2023-07-17 PROCEDURE — 36415 COLL VENOUS BLD VENIPUNCTURE: CPT

## 2023-07-17 PROCEDURE — 80053 COMPREHEN METABOLIC PANEL: CPT

## 2023-07-17 PROCEDURE — 85027 COMPLETE CBC AUTOMATED: CPT

## 2023-07-17 PROCEDURE — 80061 LIPID PANEL: CPT

## 2023-07-17 PROCEDURE — 83036 HEMOGLOBIN GLYCOSYLATED A1C: CPT

## 2023-07-19 ENCOUNTER — TELEPHONE (OUTPATIENT)
Dept: MEDICAL GROUP | Facility: PHYSICIAN GROUP | Age: 68
End: 2023-07-19
Payer: MEDICARE

## 2023-07-19 NOTE — TELEPHONE ENCOUNTER
Pt called requesting a referral kidney specialist. Pt state from the lab results he needs to see a specialist.    Pt would like a call back.    Does he need an appt?

## 2023-07-20 DIAGNOSIS — R94.4 DECREASED GFR: ICD-10-CM

## 2023-07-27 ENCOUNTER — HOSPITAL ENCOUNTER (OUTPATIENT)
Dept: LAB | Facility: MEDICAL CENTER | Age: 68
End: 2023-07-27
Attending: NURSE PRACTITIONER
Payer: MEDICARE

## 2023-07-27 DIAGNOSIS — R94.4 DECREASED GFR: ICD-10-CM

## 2023-07-27 LAB
ALBUMIN SERPL BCP-MCNC: 4.6 G/DL (ref 3.2–4.9)
ALBUMIN/GLOB SERPL: 1.8 G/DL
ALP SERPL-CCNC: 55 U/L (ref 30–99)
ALT SERPL-CCNC: 17 U/L (ref 2–50)
ANION GAP SERPL CALC-SCNC: 12 MMOL/L (ref 7–16)
AST SERPL-CCNC: 11 U/L (ref 12–45)
BILIRUB SERPL-MCNC: 0.6 MG/DL (ref 0.1–1.5)
BUN SERPL-MCNC: 27 MG/DL (ref 8–22)
CALCIUM ALBUM COR SERPL-MCNC: 9 MG/DL (ref 8.5–10.5)
CALCIUM SERPL-MCNC: 9.5 MG/DL (ref 8.5–10.5)
CHLORIDE SERPL-SCNC: 96 MMOL/L (ref 96–112)
CO2 SERPL-SCNC: 22 MMOL/L (ref 20–33)
CREAT SERPL-MCNC: 1.37 MG/DL (ref 0.5–1.4)
GFR SERPLBLD CREATININE-BSD FMLA CKD-EPI: 55 ML/MIN/1.73 M 2
GLOBULIN SER CALC-MCNC: 2.5 G/DL (ref 1.9–3.5)
GLUCOSE SERPL-MCNC: 79 MG/DL (ref 65–99)
POTASSIUM SERPL-SCNC: 4.6 MMOL/L (ref 3.6–5.5)
PROT SERPL-MCNC: 7.1 G/DL (ref 6–8.2)
SODIUM SERPL-SCNC: 130 MMOL/L (ref 135–145)

## 2023-07-27 PROCEDURE — 36415 COLL VENOUS BLD VENIPUNCTURE: CPT

## 2023-07-27 PROCEDURE — 80053 COMPREHEN METABOLIC PANEL: CPT

## 2023-08-01 ENCOUNTER — OFFICE VISIT (OUTPATIENT)
Dept: MEDICAL GROUP | Facility: PHYSICIAN GROUP | Age: 68
End: 2023-08-01
Payer: MEDICARE

## 2023-08-01 VITALS
BODY MASS INDEX: 30.77 KG/M2 | DIASTOLIC BLOOD PRESSURE: 76 MMHG | TEMPERATURE: 96.8 F | SYSTOLIC BLOOD PRESSURE: 136 MMHG | RESPIRATION RATE: 12 BRPM | HEIGHT: 68 IN | OXYGEN SATURATION: 97 % | HEART RATE: 60 BPM | WEIGHT: 203 LBS

## 2023-08-01 DIAGNOSIS — E78.5 DYSLIPIDEMIA: ICD-10-CM

## 2023-08-01 DIAGNOSIS — R35.1 BENIGN PROSTATIC HYPERPLASIA WITH NOCTURIA: ICD-10-CM

## 2023-08-01 DIAGNOSIS — Z23 NEED FOR VACCINATION: ICD-10-CM

## 2023-08-01 DIAGNOSIS — R73.03 PREDIABETES: ICD-10-CM

## 2023-08-01 DIAGNOSIS — N40.1 BENIGN PROSTATIC HYPERPLASIA WITH NOCTURIA: ICD-10-CM

## 2023-08-01 DIAGNOSIS — I10 ESSENTIAL HYPERTENSION: ICD-10-CM

## 2023-08-01 DIAGNOSIS — Z13.6 SCREENING FOR AAA (ABDOMINAL AORTIC ANEURYSM): ICD-10-CM

## 2023-08-01 DIAGNOSIS — R94.4 DECREASED GFR: ICD-10-CM

## 2023-08-01 PROBLEM — M25.475 BILATERAL SWELLING OF FEET AND ANKLES: Status: RESOLVED | Noted: 2023-05-03 | Resolved: 2023-08-01

## 2023-08-01 PROBLEM — M25.471 BILATERAL SWELLING OF FEET AND ANKLES: Status: RESOLVED | Noted: 2023-05-03 | Resolved: 2023-08-01

## 2023-08-01 PROBLEM — M25.472 BILATERAL SWELLING OF FEET AND ANKLES: Status: RESOLVED | Noted: 2023-05-03 | Resolved: 2023-08-01

## 2023-08-01 PROBLEM — M25.474 BILATERAL SWELLING OF FEET AND ANKLES: Status: RESOLVED | Noted: 2023-05-03 | Resolved: 2023-08-01

## 2023-08-01 PROCEDURE — 3075F SYST BP GE 130 - 139MM HG: CPT | Performed by: NURSE PRACTITIONER

## 2023-08-01 PROCEDURE — 99214 OFFICE O/P EST MOD 30 MIN: CPT | Performed by: NURSE PRACTITIONER

## 2023-08-01 PROCEDURE — 3078F DIAST BP <80 MM HG: CPT | Performed by: NURSE PRACTITIONER

## 2023-08-01 ASSESSMENT — ENCOUNTER SYMPTOMS
HEADACHES: 0
FEVER: 0
DIZZINESS: 0
SHORTNESS OF BREATH: 0
WEIGHT LOSS: 0
CHILLS: 0

## 2023-08-01 ASSESSMENT — FIBROSIS 4 INDEX: FIB4 SCORE: 1.51

## 2023-08-01 NOTE — PROGRESS NOTES
"CC:  Chief Complaint   Patient presents with    Lab Results       HISTORY OF PRESENT ILLNESS: Patient is a 68 y.o. male established patient presenting     Problem   Prediabetes    Labs from 7/17/2023 show HA1c 5.7     Decreased Gfr    Labs from 7/17/2023 show 44 new labs completed 7/27/2023 show improvement to 55     Benign Prostatic Hyperplasia With Nocturia    PSA 3/10/2023 0.49  Continues to take Hytrin 10 mg daily   Continues to follow with Urology Dr. Navas     Dyslipidemia    Labs from 7/17/2023  Chol 111  Tri 72  LDL 42  HDL 55  Continues to take rosuvastatin 5 mg daily   LFTs are stable           Review of Systems   Constitutional:  Negative for chills, fever and weight loss.   Respiratory:  Negative for shortness of breath.    Cardiovascular:  Negative for chest pain and leg swelling.   Neurological:  Negative for dizziness and headaches.             - NOTE: All other systems reviewed and are negative, except as in HPI.      Exam:    /76 (BP Location: Right arm, Patient Position: Sitting, BP Cuff Size: Adult)   Pulse 60   Temp 36 °C (96.8 °F) (Temporal)   Resp 12   Ht 1.727 m (5' 8\")   Wt 92.1 kg (203 lb)   SpO2 97%  Body mass index is 30.87 kg/m².    Physical Exam  Constitutional:       General: He is not in acute distress.     Appearance: Normal appearance. He is not ill-appearing.   HENT:      Head: Normocephalic and atraumatic.   Pulmonary:      Effort: Pulmonary effort is normal.   Musculoskeletal:         General: Normal range of motion.      Cervical back: Normal range of motion.   Skin:     General: Skin is warm and dry.      Capillary Refill: Capillary refill takes less than 2 seconds.   Neurological:      Mental Status: He is alert and oriented to person, place, and time.   Psychiatric:         Behavior: Behavior normal.         Reviewed labs with patient from 7/17/2023 and 7/27/2023    Assessment/Plan:    1. Dyslipidemia  Chronic and stable condition  We will get new labs in 3 " months  Continue with diet and exercise  Continue with rosuvastatin    2. Prediabetes  Chronic and stable condition  Continue with diet and exercise    3. Benign prostatic hyperplasia with nocturia  Chronic and stable condition  Continue tamsulosin    4. Decreased GFR  Chronic and stable condition  GFR has improved we will reevaluate again in 3 months  Continue with hydration    5. Need for vaccination  - Zoster Vac Recomb Adjuvanted (SHINGRIX) 50 MCG/0.5ML Recon Susp; Inject 0.5 mL into the shoulder, thigh, or buttocks one time for 1 dose.  Dispense: 0.5 mL; Refill: 0    6. Screening for AAA (abdominal aortic aneurysm)  - US-ABDOMINAL AORTA W/O DOPPLER; Future       Discussed with patient possible alternative diagnoses, patient is to take all medications as prescribed.     If symptoms persist FU w/PCP, if symptoms worsen go to emergency room.     If experiencing any side effects from prescribed medications reports to the office immediately or go to emergency room.    Reviewed indication, dosage, usage and potential adverse effects of prescribed medications.     Reviewed risks and benefits of treatment plan. Patient verbalizes understanding of all instruction and verbally agrees to plan.      Return for Needs AWV.      Please note that this dictation was created using voice recognition software. I have made every reasonable attempt to correct obvious errors, but I expect that there are errors of grammar and possibly content that I did not discover before finalizing the note.

## 2023-08-23 ENCOUNTER — HOSPITAL ENCOUNTER (OUTPATIENT)
Dept: RADIOLOGY | Facility: MEDICAL CENTER | Age: 68
End: 2023-08-23
Attending: NURSE PRACTITIONER
Payer: MEDICARE

## 2023-08-23 DIAGNOSIS — Z13.6 SCREENING FOR AAA (ABDOMINAL AORTIC ANEURYSM): ICD-10-CM

## 2023-08-23 PROCEDURE — 76775 US EXAM ABDO BACK WALL LIM: CPT

## 2023-11-16 DIAGNOSIS — I10 ESSENTIAL HYPERTENSION: ICD-10-CM

## 2023-11-16 RX ORDER — BENAZEPRIL HYDROCHLORIDE 40 MG/1
40 TABLET ORAL DAILY
Qty: 100 TABLET | Refills: 3 | Status: SHIPPED | OUTPATIENT
Start: 2023-11-16

## 2023-12-05 ENCOUNTER — TELEPHONE (OUTPATIENT)
Dept: HEALTH INFORMATION MANAGEMENT | Facility: OTHER | Age: 68
End: 2023-12-05
Payer: MEDICARE

## 2023-12-08 ENCOUNTER — APPOINTMENT (OUTPATIENT)
Dept: CARDIOLOGY | Facility: MEDICAL CENTER | Age: 68
End: 2023-12-08
Attending: NURSE PRACTITIONER
Payer: MEDICARE

## 2024-03-09 NOTE — ASSESSMENT & PLAN NOTE
Addendum from previous note.  Labs received from [facility] for blood work that was completed on 3/5/2021.  Fasting blood glucose was 144  Hemoglobin A1c was 5.8   Statement Selected

## 2024-03-13 ENCOUNTER — TELEPHONE (OUTPATIENT)
Dept: HEALTH INFORMATION MANAGEMENT | Facility: OTHER | Age: 69
End: 2024-03-13
Payer: MEDICARE

## 2024-04-12 ENCOUNTER — HOSPITAL ENCOUNTER (OUTPATIENT)
Dept: LAB | Facility: MEDICAL CENTER | Age: 69
End: 2024-04-12
Attending: NURSE PRACTITIONER
Payer: MEDICARE

## 2024-04-12 DIAGNOSIS — E78.5 DYSLIPIDEMIA: ICD-10-CM

## 2024-04-12 DIAGNOSIS — R73.03 PREDIABETES: ICD-10-CM

## 2024-04-12 LAB
ANION GAP SERPL CALC-SCNC: 12 MMOL/L (ref 7–16)
BUN SERPL-MCNC: 26 MG/DL (ref 8–22)
CALCIUM SERPL-MCNC: 9.2 MG/DL (ref 8.5–10.5)
CHLORIDE SERPL-SCNC: 99 MMOL/L (ref 96–112)
CHOLEST SERPL-MCNC: 113 MG/DL (ref 100–199)
CO2 SERPL-SCNC: 20 MMOL/L (ref 20–33)
CREAT SERPL-MCNC: 1.63 MG/DL (ref 0.5–1.4)
FASTING STATUS PATIENT QL REPORTED: NORMAL
GFR SERPLBLD CREATININE-BSD FMLA CKD-EPI: 45 ML/MIN/1.73 M 2
GLUCOSE SERPL-MCNC: 115 MG/DL (ref 65–99)
HDLC SERPL-MCNC: 63 MG/DL
LDLC SERPL CALC-MCNC: 35 MG/DL
POTASSIUM SERPL-SCNC: 5.4 MMOL/L (ref 3.6–5.5)
SODIUM SERPL-SCNC: 131 MMOL/L (ref 135–145)
TRIGL SERPL-MCNC: 74 MG/DL (ref 0–149)

## 2024-04-12 PROCEDURE — 80048 BASIC METABOLIC PNL TOTAL CA: CPT

## 2024-04-12 PROCEDURE — 36415 COLL VENOUS BLD VENIPUNCTURE: CPT

## 2024-04-12 PROCEDURE — 80061 LIPID PANEL: CPT

## 2024-04-15 ENCOUNTER — HOSPITAL ENCOUNTER (OUTPATIENT)
Dept: LAB | Facility: MEDICAL CENTER | Age: 69
End: 2024-04-15
Attending: PHYSICIAN ASSISTANT
Payer: MEDICARE

## 2024-04-15 LAB — PSA SERPL-MCNC: 0.92 NG/ML (ref 0–4)

## 2024-04-15 PROCEDURE — 84153 ASSAY OF PSA TOTAL: CPT

## 2024-04-15 PROCEDURE — 36415 COLL VENOUS BLD VENIPUNCTURE: CPT

## 2024-04-16 ENCOUNTER — TELEPHONE (OUTPATIENT)
Dept: MEDICAL GROUP | Facility: PHYSICIAN GROUP | Age: 69
End: 2024-04-16

## 2024-04-16 NOTE — TELEPHONE ENCOUNTER
Caller Name: Antolin Omer    Call Back Number: [phone] (home)     How would the patient prefer to be contacted with a response: iRezQ message    Requesting labs re-ordered.

## 2024-04-26 ENCOUNTER — OFFICE VISIT (OUTPATIENT)
Dept: MEDICAL GROUP | Facility: PHYSICIAN GROUP | Age: 69
End: 2024-04-26
Payer: MEDICARE

## 2024-04-26 VITALS
DIASTOLIC BLOOD PRESSURE: 60 MMHG | TEMPERATURE: 97.6 F | RESPIRATION RATE: 16 BRPM | HEART RATE: 49 BPM | WEIGHT: 202 LBS | SYSTOLIC BLOOD PRESSURE: 122 MMHG | BODY MASS INDEX: 30.62 KG/M2 | OXYGEN SATURATION: 98 % | HEIGHT: 68 IN

## 2024-04-26 DIAGNOSIS — R73.03 PREDIABETES: ICD-10-CM

## 2024-04-26 DIAGNOSIS — N18.31 CHRONIC KIDNEY DISEASE, STAGE 3A: ICD-10-CM

## 2024-04-26 DIAGNOSIS — R94.4 DECREASED GFR: ICD-10-CM

## 2024-04-26 DIAGNOSIS — I77.9 DISORDER OF ARTERIES AND ARTERIOLES, UNSPECIFIED (HCC): ICD-10-CM

## 2024-04-26 DIAGNOSIS — E78.5 DYSLIPIDEMIA: ICD-10-CM

## 2024-04-26 DIAGNOSIS — D69.6 THROMBOCYTOPENIA, UNSPECIFIED (HCC): ICD-10-CM

## 2024-04-26 DIAGNOSIS — I10 ESSENTIAL HYPERTENSION: ICD-10-CM

## 2024-04-26 PROCEDURE — 99214 OFFICE O/P EST MOD 30 MIN: CPT | Performed by: NURSE PRACTITIONER

## 2024-04-26 PROCEDURE — 3074F SYST BP LT 130 MM HG: CPT | Performed by: NURSE PRACTITIONER

## 2024-04-26 PROCEDURE — 3078F DIAST BP <80 MM HG: CPT | Performed by: NURSE PRACTITIONER

## 2024-04-26 RX ORDER — CEPHALEXIN 500 MG/1
1 CAPSULE ORAL 4 TIMES DAILY
COMMUNITY

## 2024-04-26 RX ORDER — CELECOXIB 200 MG/1
1 CAPSULE ORAL 2 TIMES DAILY
COMMUNITY

## 2024-04-26 RX ORDER — BENAZEPRIL HYDROCHLORIDE 40 MG/1
1 TABLET ORAL DAILY
COMMUNITY
End: 2024-04-26

## 2024-04-26 RX ORDER — NIFEDIPINE 90 MG/1
1 TABLET, EXTENDED RELEASE ORAL DAILY
COMMUNITY
End: 2024-04-26

## 2024-04-26 RX ORDER — TADALAFIL 5 MG/1
1 TABLET ORAL DAILY
COMMUNITY
End: 2024-04-26

## 2024-04-26 RX ORDER — CARVEDILOL 25 MG/1
1 TABLET ORAL 2 TIMES DAILY WITH MEALS
COMMUNITY
End: 2024-04-26

## 2024-04-26 RX ORDER — ROSUVASTATIN CALCIUM 5 MG/1
1 TABLET, COATED ORAL DAILY
COMMUNITY
End: 2024-04-26

## 2024-04-26 RX ORDER — TERAZOSIN 10 MG/1
1 CAPSULE ORAL DAILY
COMMUNITY
End: 2024-04-26

## 2024-04-26 ASSESSMENT — ENCOUNTER SYMPTOMS
CHILLS: 0
SHORTNESS OF BREATH: 0
WEIGHT LOSS: 0
DIZZINESS: 0
HEADACHES: 0
CLAUDICATION: 0
PALPITATIONS: 0
FEVER: 0

## 2024-04-26 ASSESSMENT — FIBROSIS 4 INDEX: FIB4 SCORE: 1.51

## 2024-04-26 ASSESSMENT — PATIENT HEALTH QUESTIONNAIRE - PHQ9: CLINICAL INTERPRETATION OF PHQ2 SCORE: 0

## 2024-04-26 NOTE — PROGRESS NOTES
Verbal consent was acquired by the patient to use [employer] ambient listening note generation during this visit     CC:  Chief Complaint   Patient presents with    Requesting Labs     Routine bloodwork     Lab Results     FV 4/12       Antolin Omer 68 y.o. male  patient presenting for     History of Present Illness  The patient is a 68-year-old male presenting today for a follow-up regarding his blood work and has expressed concerns regarding his blood work.    Lower extremity edema  The patient reports experiencing leg swelling, which has slightly intensified since 07/2023.  It was discussed that he use compression stockings however he felt that they were uncomfortable and has not followed through with them.   He has been elevating his legs, monitoring his salt intake, and incorporating electrolytes into his diet.  His leg swelling intensifies when he is driving or sitting at his desk for extended periods.  The swelling subsides upon returning home and elevating his legs. He recalls undergoing testing for venous insufficiency at [place], [place], but can not recall the specifics of the test.    Disorders of arteries and arterioles  The patient has been unable to secure an appointment with a cardiologist for the past 3 to 6 months. He is requesting a referral to a cardiologist in [place]. His current medication regimen includes nifedipine and carvedilol. He previously reported experiencing lightheadedness during a cardiology consultation last year, which led to the possibility of reducing his medication dosage if he continued to experience it. However, he has not experienced any lightheadedness recently.      Review of Systems   Constitutional:  Negative for chills, fever, malaise/fatigue and weight loss.   Respiratory:  Negative for shortness of breath.    Cardiovascular:  Positive for leg swelling. Negative for chest pain, palpitations and claudication.   Neurological:  Negative for dizziness and  "headaches.       /60   Pulse (!) 49   Temp 36.4 °C (97.6 °F) (Temporal)   Resp 16   Ht 1.727 m (5' 8\")   Wt 91.6 kg (202 lb)   SpO2 98% , Body mass index is 30.71 kg/m².    Physical Exam  Constitutional:       Appearance: Normal appearance. He is normal weight.   HENT:      Head: Normocephalic and atraumatic.   Cardiovascular:      Rate and Rhythm: Normal rate and regular rhythm.      Pulses: Normal pulses.      Heart sounds: Normal heart sounds.   Pulmonary:      Effort: Pulmonary effort is normal.      Breath sounds: Normal breath sounds.   Musculoskeletal:         General: Normal range of motion.      Cervical back: Normal range of motion.   Skin:     General: Skin is warm and dry.   Neurological:      Mental Status: He is alert and oriented to person, place, and time.   Psychiatric:         Mood and Affect: Mood normal.         Behavior: Behavior normal.         Thought Content: Thought content normal.         Judgment: Judgment normal.           Results  Laboratory Studies/12/2024  Cholesterol 113 triglycerides 74 HDL 63 LDL 35  Sodium 131 potassium 5.4 glucose 115 BUN 26 creatinine 1.63  EGFR 45    Assessment and Plan    Assessment & Plan  1. Decreased GFR  Chronic and stable condition  We will redo labs in the next 1 to 2 weeks  - Comp Metabolic Panel; Future    2. Prediabetes  Chronic and stable condition  - Comp Metabolic Panel; Future    3. Thrombocytopenia, unspecified (HCC)  Chronic and stable condition  - CBC WITHOUT DIFFERENTIAL; Future    4. Essential hypertension  Chronic and stable condition  - REFERRAL TO CARDIOLOGY    5. Dyslipidemia  Chronic and stable condition  - REFERRAL TO CARDIOLOGY    6. Chronic kidney disease, stage 3a  Chronic and stable condition  Completely labs in 1 to 2 weeks    7. Disorder of arteries and arterioles, unspecified (HCC)  Chronic and stable condition  - REFERRAL TO CARDIOLOGY      [facility]    Diagnosis to address: I77.9 - Disorder of arteries and " arterioles, unspecified (HCC)  Assessment and plan: Chronic, stable, as based on today's assessment and impact on other conditions evaluated today. Continue with current treatment plan: follows with cardiology, currently on 5 mg rosuvastatin daily, 81 mg asa daily    Follow-up with specialist as directed, but at least annually.  Diagnosis: D69.6 - Thrombocytopenia, unspecified (HCC)  Assessment and plan: Chronic, stable. Continue with current defined treatment plan: needs new labs. Follow-up at least annually.  Diagnosis: N18.31 - Chronic kidney disease, stage 3a  Assessment and plan: Chronic, exacerbated. Treatment and follow up: will reevaluate with new testing.     Last edited 04/26/24 12:50 PDT by MATT [email or web address].            Discussed with patient possible alternative diagnoses, patient is to take all medications as prescribed.     If symptoms persist FU w/PCP, if symptoms worsen go to emergency room.     If experiencing any side effects from prescribed medications reports to the office immediately or go to emergency room.    Reviewed indication, dosage, usage and potential adverse effects of prescribed medications.     Reviewed risks and benefits of treatment plan. Patient verbalizes understanding of all instruction and verbally agrees to plan.    Return for pending new labs.    This note was created using voice recognition software ([employer]). The accuracy of the dictation is limited by the abilities of the software. I have reviewed the note prior to signing, however some errors in grammar and context are still possible. If you have any questions related to this note please do not hesitate to contact our office.

## 2024-05-29 ENCOUNTER — HOSPITAL ENCOUNTER (OUTPATIENT)
Dept: LAB | Facility: MEDICAL CENTER | Age: 69
End: 2024-05-29
Attending: NURSE PRACTITIONER
Payer: MEDICARE

## 2024-05-29 DIAGNOSIS — R73.03 PREDIABETES: ICD-10-CM

## 2024-05-29 DIAGNOSIS — R94.4 DECREASED GFR: ICD-10-CM

## 2024-05-29 DIAGNOSIS — D69.6 THROMBOCYTOPENIA, UNSPECIFIED (HCC): ICD-10-CM

## 2024-05-29 LAB
ERYTHROCYTE [DISTWIDTH] IN BLOOD BY AUTOMATED COUNT: 41.1 FL (ref 35.9–50)
HCT VFR BLD AUTO: 34.2 % (ref 42–52)
HGB BLD-MCNC: 12.2 G/DL (ref 14–18)
MCH RBC QN AUTO: 30.4 PG (ref 27–33)
MCHC RBC AUTO-ENTMCNC: 35.7 G/DL (ref 32.3–36.5)
MCV RBC AUTO: 85.3 FL (ref 81.4–97.8)
PLATELET # BLD AUTO: 108 K/UL (ref 164–446)
PMV BLD AUTO: 10.6 FL (ref 9–12.9)
RBC # BLD AUTO: 4.01 M/UL (ref 4.7–6.1)
WBC # BLD AUTO: 6.8 K/UL (ref 4.8–10.8)

## 2024-05-30 LAB
ALBUMIN SERPL BCP-MCNC: 4.1 G/DL (ref 3.2–4.9)
ALBUMIN/GLOB SERPL: 1.8 G/DL
ALP SERPL-CCNC: 64 U/L (ref 30–99)
ALT SERPL-CCNC: 12 U/L (ref 2–50)
ANION GAP SERPL CALC-SCNC: 13 MMOL/L (ref 7–16)
AST SERPL-CCNC: 13 U/L (ref 12–45)
BILIRUB SERPL-MCNC: 0.5 MG/DL (ref 0.1–1.5)
BUN SERPL-MCNC: 28 MG/DL (ref 8–22)
CALCIUM ALBUM COR SERPL-MCNC: 9 MG/DL (ref 8.5–10.5)
CALCIUM SERPL-MCNC: 9.1 MG/DL (ref 8.5–10.5)
CHLORIDE SERPL-SCNC: 100 MMOL/L (ref 96–112)
CO2 SERPL-SCNC: 17 MMOL/L (ref 20–33)
CREAT SERPL-MCNC: 1.34 MG/DL (ref 0.5–1.4)
GFR SERPLBLD CREATININE-BSD FMLA CKD-EPI: 57 ML/MIN/1.73 M 2
GLOBULIN SER CALC-MCNC: 2.3 G/DL (ref 1.9–3.5)
GLUCOSE SERPL-MCNC: 102 MG/DL (ref 65–99)
POTASSIUM SERPL-SCNC: 5.2 MMOL/L (ref 3.6–5.5)
PROT SERPL-MCNC: 6.4 G/DL (ref 6–8.2)
SODIUM SERPL-SCNC: 130 MMOL/L (ref 135–145)

## 2024-05-31 DIAGNOSIS — D63.1 ANEMIA DUE TO CHRONIC KIDNEY DISEASE, UNSPECIFIED CKD STAGE: ICD-10-CM

## 2024-05-31 DIAGNOSIS — N18.9 ANEMIA DUE TO CHRONIC KIDNEY DISEASE, UNSPECIFIED CKD STAGE: ICD-10-CM

## 2024-06-04 ENCOUNTER — HOSPITAL ENCOUNTER (OUTPATIENT)
Dept: LAB | Facility: MEDICAL CENTER | Age: 69
End: 2024-06-04
Attending: NURSE PRACTITIONER
Payer: MEDICARE

## 2024-06-04 DIAGNOSIS — N18.9 ANEMIA DUE TO CHRONIC KIDNEY DISEASE, UNSPECIFIED CKD STAGE: ICD-10-CM

## 2024-06-04 DIAGNOSIS — D63.1 ANEMIA DUE TO CHRONIC KIDNEY DISEASE, UNSPECIFIED CKD STAGE: ICD-10-CM

## 2024-06-04 LAB
FERRITIN SERPL-MCNC: 122 NG/ML (ref 22–322)
IRON SATN MFR SERPL: 39 % (ref 15–55)
IRON SERPL-MCNC: 109 UG/DL (ref 50–180)
TIBC SERPL-MCNC: 280 UG/DL (ref 250–450)
UIBC SERPL-MCNC: 171 UG/DL (ref 110–370)

## 2024-06-04 PROCEDURE — 36415 COLL VENOUS BLD VENIPUNCTURE: CPT

## 2024-06-04 PROCEDURE — 83540 ASSAY OF IRON: CPT

## 2024-06-04 PROCEDURE — 83550 IRON BINDING TEST: CPT

## 2024-06-04 PROCEDURE — 82728 ASSAY OF FERRITIN: CPT

## 2024-06-06 ENCOUNTER — APPOINTMENT (OUTPATIENT)
Dept: MEDICAL GROUP | Facility: PHYSICIAN GROUP | Age: 69
End: 2024-06-06
Payer: MEDICARE

## 2024-06-07 ENCOUNTER — OFFICE VISIT (OUTPATIENT)
Dept: MEDICAL GROUP | Facility: PHYSICIAN GROUP | Age: 69
End: 2024-06-07
Payer: MEDICARE

## 2024-06-07 VITALS
HEART RATE: 45 BPM | TEMPERATURE: 97.8 F | WEIGHT: 204.4 LBS | DIASTOLIC BLOOD PRESSURE: 70 MMHG | BODY MASS INDEX: 30.98 KG/M2 | OXYGEN SATURATION: 99 % | RESPIRATION RATE: 18 BRPM | SYSTOLIC BLOOD PRESSURE: 128 MMHG | HEIGHT: 68 IN

## 2024-06-07 DIAGNOSIS — N18.31 ANEMIA DUE TO STAGE 3A CHRONIC KIDNEY DISEASE: ICD-10-CM

## 2024-06-07 DIAGNOSIS — E87.1 HYPONATREMIA: ICD-10-CM

## 2024-06-07 DIAGNOSIS — I10 ESSENTIAL HYPERTENSION: ICD-10-CM

## 2024-06-07 DIAGNOSIS — R94.4 DECREASED GFR: ICD-10-CM

## 2024-06-07 DIAGNOSIS — D63.1 ANEMIA DUE TO STAGE 3A CHRONIC KIDNEY DISEASE: ICD-10-CM

## 2024-06-07 PROCEDURE — 3078F DIAST BP <80 MM HG: CPT | Performed by: NURSE PRACTITIONER

## 2024-06-07 PROCEDURE — 3074F SYST BP LT 130 MM HG: CPT | Performed by: NURSE PRACTITIONER

## 2024-06-07 PROCEDURE — 99214 OFFICE O/P EST MOD 30 MIN: CPT | Performed by: NURSE PRACTITIONER

## 2024-06-07 RX ORDER — SODIUM CHLORIDE 1 G/1
1 TABLET ORAL 2 TIMES DAILY
Qty: 60 TABLET | Refills: 0 | Status: SHIPPED | OUTPATIENT
Start: 2024-06-07 | End: 2024-06-26

## 2024-06-07 RX ORDER — CARVEDILOL 12.5 MG/1
12.5 TABLET ORAL 2 TIMES DAILY WITH MEALS
Qty: 60 TABLET | Refills: 0 | Status: SHIPPED | OUTPATIENT
Start: 2024-06-07 | End: 2024-06-26

## 2024-06-07 ASSESSMENT — ENCOUNTER SYMPTOMS
CLAUDICATION: 0
VOMITING: 0
WEIGHT LOSS: 0
SHORTNESS OF BREATH: 0
HEMOPTYSIS: 0
NAUSEA: 0
WHEEZING: 0
SPUTUM PRODUCTION: 0
CHILLS: 0
ORTHOPNEA: 0
CONSTIPATION: 0
DIARRHEA: 0
BRUISES/BLEEDS EASILY: 0
ABDOMINAL PAIN: 0
FEVER: 0
WEAKNESS: 0
PALPITATIONS: 0
DIZZINESS: 0
BLOOD IN STOOL: 0
COUGH: 0
DIAPHORESIS: 0
FLANK PAIN: 0
HEADACHES: 0

## 2024-06-07 ASSESSMENT — FIBROSIS 4 INDEX: FIB4 SCORE: 2.43

## 2024-06-07 NOTE — PROGRESS NOTES
"I attest that I saw the patient with Fatou MERAZ student. I performed a physical exam and medical decision making. I reviewed, verified the documentation and agree with the content and plan as written by the nurse practitioner student.       Family Nurse Practitioner Student Note    Cosigner/Preceptor: KT Solano    Chief Complaint   Patient presents with    Lab Results     5/29 & 6/4                                                                                                                                   HPI:   Antolin is a 67 y/o male who presents to the office alone today for follow-up after recent laboratory tests showed a decrease in hemoglobin and hematocrit. Additional blood work was conducted to evaluate iron and ferritin levels. Notably, the patient's glomerular filtration rate (GFR), blood urea nitrogen (BUN), and sodium levels were also found to be low.    Decreased GFR: Has had continued LE pitting edema x 2 months that worsens with sitting for long periods of time or standing. Symptoms improve after elevating his legs or laying flat to sleep at night. His last CMP was drawn on 5/29 and showed and increased in his GFR from previous 45 mL/min to 57 mL/min. His BUN has gone from 26 mg/dL to 28 mg/dL. Reports decreased urination during the day and an increase in urination while at rest. He states that he has been increasing his fluid intake, electrolytes, and drinking \"greens\" mix from [employer]. He is not wearing his compression socks due to discomfort. His last dose of Celebrex was 1 month ago. He is not taking any other NSAID's. He has +1 pitting edema to bilateral LE.He does not have any CP, orthopnea, or dyspnea.     Anemia due to stage 3a CKD: CBC done on 5/29/24 showed Hgb of 12.2, Hct of 34.9, and Platelets of 108. Additional blood work was drawn on 6/4/24 to evaluate iron and ferritin levels, which were normal. Endorses fatigue, especially after work. There is no pallor, " activity intolerance, or SOB. Does not have melena, maribel red blood in stools, hemoptysis, or hematuria.     Hyponatremia: CMP completed on 5/29/24. Sodium continues to be low with last result of 130. He states he is increasing his fluid intake, electrolytes, and uses sodium on home cooked meals.    Essential Hypertension: On Nifedipine 90 mg daily, Coreg 25 mg BID, and benazepril 40 mg daily. Last f/u with cardiology one year ago. He has discontinued care with that office. During today's visit, his heart rate was noted to be in the 45, which is lower than usual. States at home his blood pressure feels low today in office it is 128/70. He has not been monitoring or logging home BP readings. He has a new patient visit scheduled with cardiology on 6/19/2024. He does not have any CP, palpitations, or diaphoresis. He is requesting to lower his BP medication.         Current Outpatient Medications   Medication Sig Dispense Refill    NIFEdipine (ADALAT CC) 60 MG CR tablet Take 1 Tablet by mouth every day. 90 Tablet 0    carvedilol (COREG) 12.5 MG Tab Take 1 Tablet by mouth 2 times a day with meals. 60 Tablet 0    sodium chloride (SALT) 1 GM Tab Take 1 Tablet by mouth 2 times a day. 60 Tablet 0    benazepril (LOTENSIN) 40 MG tablet Take 1 Tablet by mouth every day. 100 Tablet 3    rosuvastatin (CRESTOR) 5 MG Tab Take 1 Tablet by mouth every day. 100 Tablet 4    Pyridoxine HCl (VITAMIN B-6 PO) Take 1 Tablet by mouth every day. Unable to recall dose      Cyanocobalamin (VITAMIN B-12 PO) Take 1 Tablet by mouth. 500 mcg      aspirin EC (ECOTRIN) 81 MG Tablet Delayed Response Take 1 Tablet by mouth every day.      terazosin (HYTRIN) 10 MG capsule Take 1 Capsule by mouth every day. 90 Capsule 2    vitamin D (VITAMIND D3) 1000 UNIT Tab Take 2 Tablets by mouth every day.      tadalafil (CIALIS) 5 MG tablet Take 0.5 Tablets by mouth.      celecoxib (CELEBREX) 200 MG Cap Take 1 Capsule by mouth 2 times a day.       No current  "facility-administered medications for this visit.       Allergies as of 06/07/2024    (No Known Allergies)        ROS:  Review of Systems   Constitutional:  Positive for malaise/fatigue. Negative for chills, diaphoresis, fever and weight loss.   Respiratory:  Negative for cough, hemoptysis, sputum production, shortness of breath and wheezing.    Cardiovascular:  Positive for leg swelling. Negative for chest pain, palpitations, orthopnea and claudication.   Gastrointestinal:  Negative for abdominal pain, blood in stool, constipation, diarrhea, melena, nausea and vomiting.   Genitourinary:  Negative for dysuria, flank pain, frequency, hematuria and urgency.   Skin:  Negative for itching and rash.   Neurological:  Negative for dizziness, weakness and headaches.   Endo/Heme/Allergies:  Does not bruise/bleed easily.       All systems negative expect as addressed in assessment and plan.     Objective:  /70   Pulse (!) 45   Temp 36.6 °C (97.8 °F) (Temporal)   Resp 18   Ht 1.727 m (5' 8\")   Wt 92.7 kg (204 lb 6.4 oz)   SpO2 99%   BMI 31.08 kg/m²     Physical Exam  Constitutional:       General: He is not in acute distress.     Appearance: Normal appearance. He is normal weight. He is not ill-appearing, toxic-appearing or diaphoretic.   HENT:      Mouth/Throat:      Comments: No pallor of the gingiva, oral mucosa, or tongue  Eyes:      Conjunctiva/sclera: Conjunctivae normal.      Comments: No pallor of the conjunctival rim   Cardiovascular:      Rate and Rhythm: Normal rate and regular rhythm.      Pulses: Normal pulses.      Heart sounds: Normal heart sounds. No murmur heard.     No friction rub. No gallop.   Pulmonary:      Effort: Pulmonary effort is normal. No tachypnea or respiratory distress.      Breath sounds: Normal breath sounds. No stridor. No wheezing, rhonchi or rales.   Abdominal:      General: Abdomen is flat. There is no distension.      Palpations: Abdomen is soft.      Tenderness: There is no " right CVA tenderness or left CVA tenderness.   Musculoskeletal:      Right lower le+ Pitting Edema present.      Left lower le+ Pitting Edema present.   Skin:     General: Skin is warm and dry.      Coloration: Skin is not pale.      Findings: No bruising, erythema, lesion or rash.   Neurological:      Mental Status: He is alert.   Psychiatric:         Mood and Affect: Mood normal.         Behavior: Behavior normal.         Thought Content: Thought content normal.         Judgment: Judgment normal.         Assessment and Plan:  68 y.o. male with the following issues.    1. Essential hypertension  Chronic and stable condition  - NIFEdipine (ADALAT CC) 60 MG CR tablet; Take 1 Tablet by mouth every day.  Dispense: 90 Tablet; Refill: 0  - carvedilol (COREG) 12.5 MG Tab; Take 1 Tablet by mouth 2 times a day with meals.  Dispense: 60 Tablet; Refill: 0  Nifedipine and Carvedilol doses adjusted due to bradycardia (HR 45 today) and reports of feeling like BP is low at home.   Take and record BP at home BID. Bring this journal into your cardiology appointment 2024 and to your next f/u appointment in office.     2. Hyponatremia  Chronic and ongoing condition  - sodium chloride (SALT) 1 GM Tab; Take 1 Tablet by mouth 2 times a day.  Dispense: 60 Tablet; Refill: 0  - Basic Metabolic Panel; Future  Repeat BMP in 2 weeks  Continue to monitor dietary sodium intake. Take sodium tablet BID.    3. Decreased GFR  Chronic and ongoing condition  - Basic Metabolic Panel; Future  Repeat BMP in 2 weeks  Follow up with cardiology  Continue leg elevation whenever possible and consider the use of compression stockings, especially if you anticipate sitting for prolonged periods of time.  Monitor for signs of worsening edema or potential complications, such as skin breakdown or infection.  Recommend continued leg elevation and potentially compression stockings to manage edema.    4. Anemia due to stage 3a chronic kidney  disease  Chronic and ongoing condition   We will track CBC as needed with kidney function  Follow up as needed for development of SOB, CP, activity intolerance, or worsening fatigue

## 2024-06-17 ENCOUNTER — HOSPITAL ENCOUNTER (OUTPATIENT)
Dept: LAB | Facility: MEDICAL CENTER | Age: 69
End: 2024-06-17
Attending: NURSE PRACTITIONER
Payer: MEDICARE

## 2024-06-17 DIAGNOSIS — R94.4 DECREASED GFR: ICD-10-CM

## 2024-06-17 LAB
ANION GAP SERPL CALC-SCNC: 11 MMOL/L (ref 7–16)
BUN SERPL-MCNC: 19 MG/DL (ref 8–22)
CALCIUM SERPL-MCNC: 9.4 MG/DL (ref 8.5–10.5)
CHLORIDE SERPL-SCNC: 100 MMOL/L (ref 96–112)
CO2 SERPL-SCNC: 21 MMOL/L (ref 20–33)
CREAT SERPL-MCNC: 1.19 MG/DL (ref 0.5–1.4)
GFR SERPLBLD CREATININE-BSD FMLA CKD-EPI: 65 ML/MIN/1.73 M 2
GLUCOSE SERPL-MCNC: 90 MG/DL (ref 65–99)
POTASSIUM SERPL-SCNC: 4.8 MMOL/L (ref 3.6–5.5)
SODIUM SERPL-SCNC: 132 MMOL/L (ref 135–145)

## 2024-06-17 PROCEDURE — 80048 BASIC METABOLIC PNL TOTAL CA: CPT

## 2024-06-17 PROCEDURE — 36415 COLL VENOUS BLD VENIPUNCTURE: CPT

## 2024-06-19 ENCOUNTER — OFFICE VISIT (OUTPATIENT)
Dept: CARDIOLOGY | Facility: PHYSICIAN GROUP | Age: 69
End: 2024-06-19
Attending: NURSE PRACTITIONER
Payer: MEDICARE

## 2024-06-19 VITALS
RESPIRATION RATE: 16 BRPM | SYSTOLIC BLOOD PRESSURE: 154 MMHG | HEIGHT: 68 IN | HEART RATE: 55 BPM | BODY MASS INDEX: 31.24 KG/M2 | DIASTOLIC BLOOD PRESSURE: 60 MMHG | OXYGEN SATURATION: 98 % | WEIGHT: 206.13 LBS

## 2024-06-19 DIAGNOSIS — I25.84 CORONARY ARTERY DISEASE DUE TO CALCIFIED CORONARY LESION: ICD-10-CM

## 2024-06-19 DIAGNOSIS — E78.5 DYSLIPIDEMIA: ICD-10-CM

## 2024-06-19 DIAGNOSIS — I10 ESSENTIAL HYPERTENSION: ICD-10-CM

## 2024-06-19 DIAGNOSIS — I25.10 CORONARY ARTERY DISEASE DUE TO CALCIFIED CORONARY LESION: ICD-10-CM

## 2024-06-19 PROCEDURE — 3078F DIAST BP <80 MM HG: CPT | Performed by: INTERNAL MEDICINE

## 2024-06-19 PROCEDURE — 99214 OFFICE O/P EST MOD 30 MIN: CPT | Performed by: INTERNAL MEDICINE

## 2024-06-19 PROCEDURE — 3077F SYST BP >= 140 MM HG: CPT | Performed by: INTERNAL MEDICINE

## 2024-06-19 RX ORDER — ROSUVASTATIN CALCIUM 5 MG/1
5 TABLET, COATED ORAL DAILY
Qty: 100 TABLET | Refills: 3 | Status: SHIPPED | OUTPATIENT
Start: 2024-06-19

## 2024-06-19 ASSESSMENT — FIBROSIS 4 INDEX: FIB4 SCORE: 2.43

## 2024-06-19 NOTE — PROGRESS NOTES
Cardiology Initial Consultation Note    Date of note:    6/19/2024    Primary Care Provider: KRISTEN Mccloud  Referring Provider: Kellie Cano A.P.*    Patient Name: Antolin Omer   YOB: 1955  MRN:              [id number]    Chief Complaint   Patient presents with    Hypertension    Coronary Artery Disease    Dyslipidemia       History of Present Illness: Mr. Antolin Omer is a 68-year-old man with past medical history significant for hypertension, hyperlipidemia, nonobstructive coronary artery disease/coronary calcification presents to the cardiology office for follow-up.    He was last seen by our practice on 5/18/2023 Chani Sawyer.  He remained stable from a cardiac standpoint during that visit.    Today, he presents for follow-up.  He has been compliant with his cardiac medications including his BP medication and lipid-lowering therapy.  Has been maintained on carvedilol 12.5 mg twice daily, benazepril 40 mg daily, nifedipine 60 mg daily, aspirin 81 mg daily and rosuvastatin 5 mg daily.    Recent lipid panel performed on 4/12/2024 shows excellent lipid control.  Total cholesterol 113, triglycerides 74, HDL 63, LDL 35.    No complaints of chest pain or exertional dyspnea.  No orthopnea or PND.  He works as a contractor and stays very active.  Has not experienced any cardiac symptoms with exertional activities.  Of note, he takes rosuvastatin 5 mg daily and has not experienced any major side effects.  Tells me that he forgot to take his blood pressure medications this morning.  However BP readings at home are generally well-controlled and are 120 mmHg systolic.    Cardiovascular Risk Factors:  1. Smoking status: Former smoker  2. Type II Diabetes Mellitus: Denies   Lab Results   Component Value Date/Time    HBA1C 5.7 (H) 07/17/2023 09:02 AM    HBA1C 5.6 01/18/2023 08:59 AM     3. Hypertension: Yes  4. Dyslipidemia: Yes   Cholesterol,Tot   Date Value Ref Range Status    2024 113 100 - 199 mg/dL Final     LDL   Date Value Ref Range Status   2024 35 <100 mg/dL Final     HDL   Date Value Ref Range Status   2024 63 >=40 mg/dL Final     Triglycerides   Date Value Ref Range Status   2024 74 0 - 149 mg/dL Final     5. Family history of early Coronary Artery Disease in a first degree relative (Male less than 55 years of age; Female less than 65 years of age): Denies      Review of Systems:  ROS    History reviewed. No pertinent past medical history.      History reviewed. No pertinent surgical history.      Current Outpatient Medications   Medication Sig Dispense Refill    rosuvastatin (CRESTOR) 5 MG Tab Take 1 Tablet by mouth every day. 100 Tablet 3    NIFEdipine (ADALAT CC) 60 MG CR tablet Take 1 Tablet by mouth every day. 90 Tablet 0    carvedilol (COREG) 12.5 MG Tab Take 1 Tablet by mouth 2 times a day with meals. 60 Tablet 0    sodium chloride (SALT) 1 GM Tab Take 1 Tablet by mouth 2 times a day. 60 Tablet 0    benazepril (LOTENSIN) 40 MG tablet Take 1 Tablet by mouth every day. 100 Tablet 3    Pyridoxine HCl (VITAMIN B-6 PO) Take 1 Tablet by mouth every day. Unable to recall dose      Cyanocobalamin (VITAMIN B-12 PO) Take 1 Tablet by mouth. 500 mcg      aspirin EC (ECOTRIN) 81 MG Tablet Delayed Response Take 1 Tablet by mouth every day.      terazosin (HYTRIN) 10 MG capsule Take 1 Capsule by mouth every day. 90 Capsule 2    vitamin D (VITAMIND D3) 1000 UNIT Tab Take 2 Tablets by mouth every day.      tadalafil (CIALIS) 5 MG tablet Take 0.5 Tablets by mouth.      celecoxib (CELEBREX) 200 MG Cap Take 1 Capsule by mouth 2 times a day.       No current facility-administered medications for this visit.         No Known Allergies      Family History   Problem Relation Age of Onset    Stroke Mother          of CVA at 74    Heart Disease Mother         CABG/Valve at 70    Other Father          at 28 MVA    Lung Disease Sister         COPD    Diabetes Sister   "   Heart Disease Brother         3V CAD, going for valve and CABG         Social History     Socioeconomic History    Marital status:      Spouse name: Not on file    Number of children: Not on file    Years of education: Not on file    Highest education level: Not on file   Occupational History    Occupation: retail   Tobacco Use    Smoking status: Former     Current packs/day: 0.00     Average packs/day: 0.3 packs/day for 2.0 years (0.5 ttl pk-yrs)     Types: Cigarettes     Start date:      Quit date:      Years since quittin.4    Smokeless tobacco: Never   Vaping Use    Vaping status: Never Used   Substance and Sexual Activity    Alcohol use: Yes     Alcohol/week: 2.4 - 3.0 oz     Types: 4 - 5 Cans of beer per week     Comment: occ    Drug use: Never    Sexual activity: Yes   Other Topics Concern    Not on file   Social History Narrative    Not on file     Social Determinants of Health     Financial Resource Strain: Not on file   Food Insecurity: Not on file   Transportation Needs: Not on file   Physical Activity: Not on file   Stress: Not on file   Social Connections: Not on file   Intimate Partner Violence: Not on file   Housing Stability: Not on file         Physical Exam:  Ambulatory Vitals  BP (!) 154/60 (BP Location: Left arm, Patient Position: Sitting)   Pulse (!) 55   Resp 16   Ht 1.727 m (5' 8\")   Wt 93.5 kg (206 lb 2.1 oz)   SpO2 98%    Oxygen Therapy:  Pulse Oximetry: 98 %  BP Readings from Last 4 Encounters:   24 (!) 154/60   24 128/70   24 122/60   23 122/54       Weight/BMI: Body mass index is 31.34 kg/m².  Wt Readings from Last 4 Encounters:   24 93.5 kg (206 lb 2.1 oz)   24 92.7 kg (204 lb 6.4 oz)   24 91.6 kg (202 lb)   23 93.9 kg (207 lb)         General: Not in acute distress, appears comfortable  HEENT: OP clear   Neck:  No carotid bruits, No JVD appreciated  CVS:  RRR, Normal S1, S2. No murmurs, rubs or gallops  Resp: " Normal respiratory effort, lungs CTA bilaterally. No rales or rhonchi  Abdomen: Soft, non-distended, non-tender to palpation  Skin: No obvious rashes, no cyanosis  Neurological: Alert and oriented x3, moves all extremities, no focal neurologic deficits  Psychiatric: Appropriate affect  Extremities:   Extremities warm, no significant lower extremity edema, pulses intact    Lab Data Review:  Lab Results   Component Value Date/Time    CHOLSTRLTOT 113 04/12/2024 11:56 AM    LDL 35 04/12/2024 11:56 AM    HDL 63 04/12/2024 11:56 AM    TRIGLYCERIDE 74 04/12/2024 11:56 AM       Lab Results   Component Value Date/Time    SODIUM 132 (L) 06/17/2024 02:16 PM    POTASSIUM 4.8 06/17/2024 02:16 PM    CHLORIDE 100 06/17/2024 02:16 PM    CO2 21 06/17/2024 02:16 PM    GLUCOSE 90 06/17/2024 02:16 PM    BUN 19 06/17/2024 02:16 PM    CREATININE 1.19 06/17/2024 02:16 PM     Lab Results   Component Value Date/Time    ALKPHOSPHAT 64 05/29/2024 12:02 PM    ASTSGOT 13 05/29/2024 12:02 PM    ALTSGPT 12 05/29/2024 12:02 PM    TBILIRUBIN 0.5 05/29/2024 12:02 PM      Lab Results   Component Value Date/Time    WBC 6.8 05/29/2024 12:02 PM    HEMOGLOBIN 12.2 (L) 05/29/2024 12:02 PM     Lab Results   Component Value Date/Time    HBA1C 5.7 (H) 07/17/2023 09:02 AM    HBA1C 5.6 01/18/2023 08:59 AM         Cardiac Imaging and Procedures Review:    EKG dated 2021:   My personal interpretation is as rhythm, first-degree AV block, PAC    EKG dated 6/19/2024:  My personal interpretation is sinus rhythm, baseline artifact    Echo:   TTE 9/23/21  The left ventricular ejection fraction is visually estimated to be 65-70%.  The aortic valve is not well visualized, probably trileaflet with trivial sclerosis and normal function.  Right ventricular systolic pressure is estimated to be 33 mmHg.        Assessment & Plan     1. Coronary artery disease due to calcified coronary lesion  EKG - Clinic Performed      2. Dyslipidemia  rosuvastatin (CRESTOR) 5 MG Tab      3.  Essential hypertension              Medical Decision Making:  Mr. Antolin Omer is a 68-year-old man with past medical history significant for hypertension, hyperlipidemia, nonobstructive coronary artery disease/coronary calcification presents to the cardiology office for follow-up.    1. Coronary artery disease due to calcified coronary lesion  -Known history of CAD with elevated total calcium score.  Underwent coronary angiogram in [place] and is found to have nonobstructive CAD.  He remains stable from a CV standpoint.  No angina or exertional dyspnea.  Continue aggressive cardiovascular risk factor modification.  LDL is currently under excellent control.  No changes to statin therapy.  Refills for rosuvastatin 5 mg daily will be sent to his pharmacy.  Repeat lipid panel in 1 year.    2. Dyslipidemia  -Continue rosuvastatin 5 mg daily.  Repeat lipid panel in 1 year.    3. Essential hypertension  -BP is elevated in office today at 154/60 mmHg.  However he did not take his 3 antihypertensive medications this morning.  He is normally on carvedilol, benazepril and nifedipine.  Advised that he take his BP medications as soon as he gets home.  Instructed to maintain blood pressure log and to call our office if BP remains elevated after taking his meds    It was a pleasure seeing Mr. Antolin Omer in the office today. Return in about 1 year (around 6/19/2025) for Coronary artery disease, Hypertension. Patient is aware to call the cardiology clinic with any questions or concerns.      Pato Garner MD, [facility]  Cardiologist, [facility]  [facility], [address]  [address] [address]  Phone: [phone]  Fax: [phone]    Please note that this dictation was created using voice recognition software. I have made every reasonable attempt to correct obvious errors, but it is possible there are errors of grammar and possibly content that I did not  discover before finalizing the note.

## 2024-06-26 DIAGNOSIS — I10 ESSENTIAL HYPERTENSION: ICD-10-CM

## 2024-06-26 DIAGNOSIS — E87.1 HYPONATREMIA: ICD-10-CM

## 2024-06-26 RX ORDER — SODIUM CHLORIDE 1 G/1
1 TABLET ORAL 2 TIMES DAILY
Qty: 200 TABLET | Refills: 0 | Status: SHIPPED | OUTPATIENT
Start: 2024-06-26

## 2024-06-26 RX ORDER — CARVEDILOL 12.5 MG/1
12.5 TABLET ORAL 2 TIMES DAILY WITH MEALS
Qty: 200 TABLET | Refills: 0 | Status: SHIPPED | OUTPATIENT
Start: 2024-06-26

## 2024-06-26 NOTE — TELEPHONE ENCOUNTER
Received request via: Pharmacy    Was the patient seen in the last year in this department? Yes    Does the patient have an active prescription (recently filled or refills available) for medication(s) requested? No    Pharmacy Name: [facility]     Does the patient have [facility] Plus and need 100 day supply (blood pressure, diabetes and cholesterol meds only)? Yes, quantity updated to 100 days

## 2024-08-30 DIAGNOSIS — I10 ESSENTIAL HYPERTENSION: ICD-10-CM

## 2024-09-28 DIAGNOSIS — E87.1 HYPONATREMIA: ICD-10-CM

## 2024-10-01 RX ORDER — SODIUM CHLORIDE 1 G/1
1 TABLET ORAL 2 TIMES DAILY
Qty: 200 TABLET | Refills: 0 | Status: SHIPPED | OUTPATIENT
Start: 2024-10-01

## 2024-10-05 DIAGNOSIS — I10 ESSENTIAL HYPERTENSION: ICD-10-CM

## 2024-10-08 RX ORDER — CARVEDILOL 12.5 MG/1
12.5 TABLET ORAL 2 TIMES DAILY WITH MEALS
Qty: 200 TABLET | Refills: 3 | Status: SHIPPED | OUTPATIENT
Start: 2024-10-08

## 2024-11-30 DIAGNOSIS — I10 ESSENTIAL HYPERTENSION: ICD-10-CM

## 2024-12-02 NOTE — TELEPHONE ENCOUNTER
Received request via: Pharmacy    Was the patient seen in the last year in this department? Yes    Does the patient have an active prescription (recently filled or refills available) for medication(s) requested? No    Pharmacy Name: [facility]     Does the patient have custodial Plus and need 100-day supply? (This applies to ALL medications) Yes, quantity updated to 100 days

## 2025-01-12 DIAGNOSIS — I10 ESSENTIAL HYPERTENSION: ICD-10-CM

## 2025-01-12 DIAGNOSIS — E87.1 HYPONATREMIA: ICD-10-CM

## 2025-01-14 RX ORDER — SODIUM CHLORIDE 1 G/1
1 TABLET ORAL 2 TIMES DAILY
Qty: 200 TABLET | Refills: 0 | Status: SHIPPED | OUTPATIENT
Start: 2025-01-14

## 2025-01-14 RX ORDER — BENAZEPRIL HYDROCHLORIDE 40 MG/1
40 TABLET ORAL DAILY
Qty: 100 TABLET | Refills: 0 | Status: SHIPPED | OUTPATIENT
Start: 2025-01-14

## 2025-03-01 DIAGNOSIS — I10 ESSENTIAL HYPERTENSION: ICD-10-CM

## 2025-03-03 NOTE — TELEPHONE ENCOUNTER
Received request via: Patient    Was the patient seen in the last year in this department? Yes    Does the patient have an active prescription (recently filled or refills available) for medication(s) requested? No    Pharmacy Name: [facility]    Does the patient have [facility] and need 100-day supply? (This applies to ALL medications) Yes, quantity updated to 100 days

## 2025-04-19 DIAGNOSIS — I10 ESSENTIAL HYPERTENSION: ICD-10-CM

## 2025-04-21 NOTE — TELEPHONE ENCOUNTER
Received request via: Patient    Was the patient seen in the last year in this department? Yes    Does the patient have an active prescription (recently filled or refills available) for medication(s) requested? No    Pharmacy Name: [facility]    Does the patient have retirement Plus and need 100-day supply? (This applies to ALL medications) Yes, quantity updated to 100 days

## 2025-04-22 RX ORDER — BENAZEPRIL HYDROCHLORIDE 40 MG/1
40 TABLET ORAL DAILY
Qty: 100 TABLET | Refills: 0 | Status: SHIPPED | OUTPATIENT
Start: 2025-04-22

## 2025-04-24 ENCOUNTER — HOSPITAL ENCOUNTER (OUTPATIENT)
Dept: LAB | Facility: MEDICAL CENTER | Age: 70
End: 2025-04-24
Attending: PHYSICIAN ASSISTANT
Payer: MEDICARE

## 2025-04-24 LAB — PSA SERPL DL<=0.01 NG/ML-MCNC: 1.1 NG/ML (ref 0–4)

## 2025-04-24 PROCEDURE — 36415 COLL VENOUS BLD VENIPUNCTURE: CPT

## 2025-04-24 PROCEDURE — 84153 ASSAY OF PSA TOTAL: CPT

## 2025-04-30 DIAGNOSIS — E87.1 HYPONATREMIA: ICD-10-CM

## 2025-04-30 RX ORDER — SODIUM CHLORIDE 1 G/1
1 TABLET ORAL 2 TIMES DAILY
Qty: 200 TABLET | Refills: 0 | Status: SHIPPED | OUTPATIENT
Start: 2025-04-30

## 2025-04-30 NOTE — TELEPHONE ENCOUNTER
Received request via: Pharmacy    Was the patient seen in the last year in this department? Yes    Does the patient have an active prescription (recently filled or refills available) for medication(s) requested? No    Pharmacy Name: [facility]     Does the patient have [facility] Plus and need 100-day supply? (This applies to ALL medications) Yes, quantity updated to 100 days

## 2025-06-10 DIAGNOSIS — I10 ESSENTIAL HYPERTENSION: ICD-10-CM

## 2025-06-10 NOTE — TELEPHONE ENCOUNTER
Received request via: Pharmacy    Was the patient seen in the last year in this department? No    Does the patient have an active prescription (recently filled or refills available) for medication(s) requested? No    Pharmacy Name: [facility] [address] [place] [address]     Does the patient have nursing home Plus and need 100-day supply? (This applies to ALL medications) Yes, quantity updated to 100 days

## 2025-07-04 DIAGNOSIS — E78.5 DYSLIPIDEMIA: ICD-10-CM

## 2025-07-08 DIAGNOSIS — E78.5 DYSLIPIDEMIA: Primary | ICD-10-CM

## 2025-07-08 RX ORDER — ROSUVASTATIN CALCIUM 5 MG/1
5 TABLET, COATED ORAL DAILY
Qty: 100 TABLET | Refills: 0 | Status: SHIPPED | OUTPATIENT
Start: 2025-07-08

## 2025-07-08 NOTE — TELEPHONE ENCOUNTER
Is the patient due for a refill? Yes    Was the patient seen the last 15 months? Yes    Date of last office visit: 6/19/2024    Does the patient have an upcoming appointment?  No       Provider to refill:PATRICIO    Does the patient have [facility] Plus and need 100-day supply? (This applies to ALL medications) Yes, quantity updated to 100 days

## 2025-08-01 DIAGNOSIS — I10 ESSENTIAL HYPERTENSION: ICD-10-CM

## 2025-08-01 RX ORDER — BENAZEPRIL HYDROCHLORIDE 40 MG/1
40 TABLET ORAL DAILY
Qty: 100 TABLET | Refills: 0 | Status: SHIPPED | OUTPATIENT
Start: 2025-08-01